# Patient Record
Sex: MALE | Race: BLACK OR AFRICAN AMERICAN | NOT HISPANIC OR LATINO | Employment: UNEMPLOYED | URBAN - METROPOLITAN AREA
[De-identification: names, ages, dates, MRNs, and addresses within clinical notes are randomized per-mention and may not be internally consistent; named-entity substitution may affect disease eponyms.]

---

## 2018-10-25 ENCOUNTER — HISTORICAL (OUTPATIENT)
Dept: LAB | Facility: HOSPITAL | Age: 58
End: 2018-10-25

## 2018-10-25 LAB
ABS NEUT (OLG): 2.9 X10(3)/MCL (ref 1.5–6.9)
ALBUMIN SERPL-MCNC: 3.8 GM/DL (ref 3.4–5)
ALBUMIN/GLOB SERPL: 0.8 RATIO
ALP SERPL-CCNC: 99 UNIT/L (ref 30–113)
ALT SERPL-CCNC: 44 UNIT/L (ref 10–45)
AST SERPL-CCNC: 71 UNIT/L (ref 15–37)
BASOPHILS # BLD AUTO: 0.1 X10(3)/MCL (ref 0–0.1)
BASOPHILS NFR BLD AUTO: 1 % (ref 0–1)
BILIRUB SERPL-MCNC: 0.6 MG/DL (ref 0.1–0.9)
BILIRUBIN DIRECT+TOT PNL SERPL-MCNC: 0.2 MG/DL (ref 0–0.3)
BILIRUBIN DIRECT+TOT PNL SERPL-MCNC: 0.4 MG/DL
BUN SERPL-MCNC: 10 MG/DL (ref 10–20)
CALCIUM SERPL-MCNC: 9.9 MG/DL (ref 8–10.5)
CHLORIDE SERPL-SCNC: 99 MMOL/L (ref 100–108)
CO2 SERPL-SCNC: 28 MMOL/L (ref 21–35)
CREAT SERPL-MCNC: 0.69 MG/DL (ref 0.7–1.3)
EOSINOPHIL # BLD AUTO: 0.3 X10(3)/MCL (ref 0–0.6)
EOSINOPHIL NFR BLD AUTO: 4 % (ref 0–5)
ERYTHROCYTE [DISTWIDTH] IN BLOOD BY AUTOMATED COUNT: 17.3 % (ref 11.5–17)
GLOBULIN SER-MCNC: 4.6 GM/DL
GLUCOSE SERPL-MCNC: 75 MG/DL (ref 75–116)
HCT VFR BLD AUTO: 39.4 % (ref 42–52)
HGB BLD-MCNC: 12.8 GM/DL (ref 14–18)
IMM GRANULOCYTES # BLD AUTO: 0.02 10*3/UL (ref 0–0.02)
IMM GRANULOCYTES NFR BLD AUTO: 0.3 % (ref 0–0.43)
LYMPHOCYTES # BLD AUTO: 2.4 X10(3)/MCL (ref 0.5–4.1)
LYMPHOCYTES NFR BLD AUTO: 37 % (ref 15–40)
MCH RBC QN AUTO: 27 PG (ref 27–34)
MCHC RBC AUTO-ENTMCNC: 32 GM/DL (ref 31–36)
MCV RBC AUTO: 84 FL (ref 80–99)
MONOCYTES # BLD AUTO: 0.9 X10(3)/MCL (ref 0–1.1)
MONOCYTES NFR BLD AUTO: 14 % (ref 4–12)
NEUTROPHILS # BLD AUTO: 2.9 X10(3)/MCL (ref 1.5–6.9)
NEUTROPHILS NFR BLD AUTO: 44 % (ref 43–75)
PLATELET # BLD AUTO: 234 X10(3)/MCL (ref 140–400)
PMV BLD AUTO: 10.2 FL (ref 6.8–10)
POTASSIUM SERPL-SCNC: 3.8 MMOL/L (ref 3.6–5.2)
PROT SERPL-MCNC: 8.4 GM/DL (ref 6.4–8.2)
RBC # BLD AUTO: 4.71 X10(6)/MCL (ref 4.7–6.1)
SODIUM SERPL-SCNC: 139 MMOL/L (ref 135–145)
WBC # SPEC AUTO: 6.5 X10(3)/MCL (ref 4.5–11.5)

## 2018-12-13 ENCOUNTER — HISTORICAL (OUTPATIENT)
Dept: LAB | Facility: HOSPITAL | Age: 58
End: 2018-12-13

## 2018-12-13 LAB
ABS NEUT (OLG): 2.5 X10(3)/MCL (ref 1.5–6.9)
ALBUMIN SERPL-MCNC: 3.6 GM/DL (ref 3.4–5)
ALBUMIN/GLOB SERPL: 0.8 RATIO
ALP SERPL-CCNC: 113 UNIT/L (ref 30–113)
ALT SERPL-CCNC: 24 UNIT/L (ref 10–45)
AST SERPL-CCNC: 73 UNIT/L (ref 15–37)
BASOPHILS # BLD AUTO: 0 X10(3)/MCL (ref 0–0.1)
BASOPHILS NFR BLD AUTO: 1 % (ref 0–1)
BILIRUB SERPL-MCNC: 0.2 MG/DL (ref 0.1–0.9)
BILIRUBIN DIRECT+TOT PNL SERPL-MCNC: 0.1 MG/DL
BILIRUBIN DIRECT+TOT PNL SERPL-MCNC: 0.1 MG/DL (ref 0–0.3)
BUN SERPL-MCNC: 11 MG/DL (ref 10–20)
CALCIUM SERPL-MCNC: 9.1 MG/DL (ref 8–10.5)
CHLORIDE SERPL-SCNC: 100 MMOL/L (ref 100–108)
CO2 SERPL-SCNC: 27 MMOL/L (ref 21–35)
CREAT SERPL-MCNC: 0.94 MG/DL (ref 0.7–1.3)
EOSINOPHIL # BLD AUTO: 0.2 X10(3)/MCL (ref 0–0.6)
EOSINOPHIL NFR BLD AUTO: 4 % (ref 0–5)
ERYTHROCYTE [DISTWIDTH] IN BLOOD BY AUTOMATED COUNT: 16.5 % (ref 11.5–17)
GLOBULIN SER-MCNC: 4.5 GM/DL
GLUCOSE SERPL-MCNC: 83 MG/DL (ref 75–116)
HCT VFR BLD AUTO: 31.3 % (ref 42–52)
HGB BLD-MCNC: 10.4 GM/DL (ref 14–18)
IMM GRANULOCYTES # BLD AUTO: 0.02 10*3/UL (ref 0–0.02)
IMM GRANULOCYTES NFR BLD AUTO: 0.4 % (ref 0–0.43)
LYMPHOCYTES # BLD AUTO: 2 X10(3)/MCL (ref 0.5–4.1)
LYMPHOCYTES NFR BLD AUTO: 38 % (ref 15–40)
MCH RBC QN AUTO: 28 PG (ref 27–34)
MCHC RBC AUTO-ENTMCNC: 33 GM/DL (ref 31–36)
MCV RBC AUTO: 85 FL (ref 80–99)
MONOCYTES # BLD AUTO: 0.6 X10(3)/MCL (ref 0–1.1)
MONOCYTES NFR BLD AUTO: 11 % (ref 4–12)
NEUTROPHILS # BLD AUTO: 2.5 X10(3)/MCL (ref 1.5–6.9)
NEUTROPHILS NFR BLD AUTO: 46 % (ref 43–75)
PLATELET # BLD AUTO: 181 X10(3)/MCL (ref 140–400)
PMV BLD AUTO: 8.9 FL (ref 6.8–10)
POTASSIUM SERPL-SCNC: 3.8 MMOL/L (ref 3.6–5.2)
PROT SERPL-MCNC: 8.1 GM/DL (ref 6.4–8.2)
RBC # BLD AUTO: 3.67 X10(6)/MCL (ref 4.7–6.1)
SODIUM SERPL-SCNC: 140 MMOL/L (ref 135–145)
WBC # SPEC AUTO: 5.4 X10(3)/MCL (ref 4.5–11.5)

## 2019-12-18 ENCOUNTER — HISTORICAL (OUTPATIENT)
Dept: ADMINISTRATIVE | Facility: HOSPITAL | Age: 59
End: 2019-12-18

## 2019-12-24 LAB
FINAL CULTURE: NORMAL
FINAL CULTURE: NORMAL

## 2020-08-20 ENCOUNTER — HISTORICAL (OUTPATIENT)
Dept: ADMINISTRATIVE | Facility: HOSPITAL | Age: 60
End: 2020-08-20

## 2020-08-26 LAB
FINAL CULTURE: NORMAL
FINAL CULTURE: NORMAL

## 2021-06-24 ENCOUNTER — HISTORICAL (OUTPATIENT)
Dept: ADMINISTRATIVE | Facility: HOSPITAL | Age: 61
End: 2021-06-24

## 2021-06-29 LAB — FINAL CULTURE: NORMAL

## 2021-07-05 ENCOUNTER — HISTORICAL (OUTPATIENT)
Dept: ADMINISTRATIVE | Facility: HOSPITAL | Age: 61
End: 2021-07-05

## 2021-07-11 LAB
FINAL CULTURE: NORMAL
FINAL CULTURE: NORMAL

## 2022-01-21 ENCOUNTER — HISTORICAL (OUTPATIENT)
Dept: RADIOLOGY | Facility: HOSPITAL | Age: 62
End: 2022-01-21

## 2022-01-24 ENCOUNTER — HISTORICAL (OUTPATIENT)
Dept: RADIOLOGY | Facility: HOSPITAL | Age: 62
End: 2022-01-24

## 2022-02-21 ENCOUNTER — HISTORICAL (OUTPATIENT)
Dept: LAB | Facility: HOSPITAL | Age: 62
End: 2022-02-21

## 2022-02-21 LAB
ABS NEUT (OLG): 2.7 (ref 1.5–6.9)
ALBUMIN SERPL-MCNC: 4.2 G/DL (ref 3.5–5)
ALBUMIN/GLOB SERPL: 1.1 {RATIO} (ref 1.1–2)
ALP SERPL-CCNC: 91 U/L (ref 40–150)
ALT SERPL-CCNC: 9 U/L (ref 0–55)
AST SERPL-CCNC: 17 U/L (ref 5–34)
BILIRUB SERPL-MCNC: 0.2 MG/DL
BILIRUBIN DIRECT+TOT PNL SERPL-MCNC: 0.1 (ref 0–0.5)
BILIRUBIN DIRECT+TOT PNL SERPL-MCNC: 0.1 (ref 0–0.8)
BUN SERPL-MCNC: 17 MG/DL (ref 8.4–25.7)
CALCIUM SERPL-MCNC: 9.7 MG/DL (ref 8.7–10.5)
CHLORIDE SERPL-SCNC: 104 MMOL/L (ref 98–107)
CO2 SERPL-SCNC: 24 MMOL/L (ref 22–29)
CREAT SERPL-MCNC: 0.99 MG/DL (ref 0.73–1.18)
ERYTHROCYTE [DISTWIDTH] IN BLOOD BY AUTOMATED COUNT: 17.6 % (ref 11.5–17)
GLOBULIN SER-MCNC: 3.7 G/DL (ref 2.4–3.5)
GLUCOSE SERPL-MCNC: 70 MG/DL (ref 74–100)
HCT VFR BLD AUTO: 39.6 % (ref 42–52)
HEMOLYSIS INTERF INDEX SERPL-ACNC: >10
HEMOLYSIS INTERF INDEX SERPL-ACNC: >10
HGB BLD-MCNC: 12.5 G/DL (ref 14–18)
ICTERIC INTERF INDEX SERPL-ACNC: 0
LIPEMIC INTERF INDEX SERPL-ACNC: 3
MAGNESIUM SERPL-MCNC: 1.9 MG/DL (ref 1.6–2.6)
MANUAL DIFF? (OHS): YES
MCH RBC QN AUTO: 25 PG (ref 27–34)
MCHC RBC AUTO-ENTMCNC: 32 G/DL (ref 31–36)
MCV RBC AUTO: 78 FL (ref 80–99)
PLATELET # BLD AUTO: 202 10*3/UL (ref 140–400)
PMV BLD AUTO: 10.5 FL (ref 6.8–10)
POTASSIUM SERPL-SCNC: 4.4 MMOL/L (ref 3.5–5.1)
PROT SERPL-MCNC: 7.9 G/DL (ref 6.4–8.3)
RBC # BLD AUTO: 5.06 10*6/UL (ref 4.7–6.1)
SODIUM SERPL-SCNC: 138 MMOL/L (ref 136–145)
WBC # SPEC AUTO: 7.3 10*3/UL (ref 4.5–11.5)

## 2022-05-19 DIAGNOSIS — C92.10 CML (CHRONIC MYELOCYTIC LEUKEMIA): Primary | ICD-10-CM

## 2022-05-19 RX ORDER — ONDANSETRON 4 MG/1
4 TABLET, FILM COATED ORAL EVERY 6 HOURS PRN
Qty: 90 TABLET | Refills: 2 | Status: SHIPPED | OUTPATIENT
Start: 2022-05-19

## 2022-05-19 RX ORDER — ONDANSETRON 4 MG/1
4 TABLET, FILM COATED ORAL EVERY 6 HOURS PRN
COMMUNITY
Start: 2022-02-04 | End: 2022-05-19 | Stop reason: SDUPTHER

## 2022-05-25 DIAGNOSIS — C92.10 CHRONIC MYELOID LEUKEMIA: Primary | ICD-10-CM

## 2022-05-26 ENCOUNTER — LAB VISIT (OUTPATIENT)
Dept: LAB | Facility: HOSPITAL | Age: 62
End: 2022-05-26
Payer: MEDICARE

## 2022-05-26 DIAGNOSIS — C92.10 CHRONIC MYELOID LEUKEMIA: ICD-10-CM

## 2022-05-26 DIAGNOSIS — C92.10 CHRONIC MYELOID LEUKEMIA: Primary | ICD-10-CM

## 2022-05-26 LAB
ABS NEUT CALC (OHS): 201.12 X10(3)/MCL (ref 2.1–9.2)
B-HCG SERPL QL: 17 %
EOSINOPHIL NFR BLD MANUAL: 1 % (ref 0–8)
EOSINOPHIL NFR BLD MANUAL: 2.12 X10(3)/MCL (ref 0–0.9)
ERYTHROCYTE [DISTWIDTH] IN BLOOD BY AUTOMATED COUNT: 19.9 % (ref 11.5–17)
HCT VFR BLD AUTO: 37.2 % (ref 42–52)
HGB BLD-MCNC: 12.2 GM/DL (ref 14–18)
IMM GRANULOCYTES # BLD AUTO: 70.67 X10(3)/MCL (ref 0–0.02)
IMM GRANULOCYTES NFR BLD AUTO: 33.4 % (ref 0–0.43)
LYMPHOCYTES NFR BLD MANUAL: 3 % (ref 13–40)
LYMPHOCYTES NFR BLD MANUAL: 6.35 X10(3)/MCL
MCH RBC QN AUTO: 25.2 PG (ref 27–31)
MCHC RBC AUTO-ENTMCNC: 32.8 MG/DL (ref 33–36)
MCV RBC AUTO: 76.9 FL (ref 80–94)
MONOCYTES NFR BLD MANUAL: 1 % (ref 2–11)
MONOCYTES NFR BLD MANUAL: 2.12 X10(3)/MCL (ref 0.1–1.3)
MYELOCYTES NFR BLD MANUAL: 15 %
NEUTROPHILS NFR BLD MANUAL: 47 % (ref 47–80)
NEUTS BAND NFR BLD MANUAL: 16 % (ref 0–11)
PLATELET # BLD AUTO: 202 X10(3)/MCL (ref 130–400)
PLATELET # BLD EST: ADEQUATE 10*3/UL
PMV BLD AUTO: 10.9 FL (ref 9.4–12.4)
RBC # BLD AUTO: 4.84 X10(6)/MCL (ref 4.7–6.1)
WBC # SPEC AUTO: 211.7 X10(3)/MCL (ref 4.5–11.5)

## 2022-05-26 PROCEDURE — 85060 BLOOD SMEAR INTERPRETATION: CPT

## 2022-05-26 PROCEDURE — 85025 COMPLETE CBC W/AUTO DIFF WBC: CPT

## 2022-05-26 PROCEDURE — 36415 COLL VENOUS BLD VENIPUNCTURE: CPT

## 2022-05-27 LAB — HEMATOLOGIST REVIEW: NORMAL

## 2022-05-31 DIAGNOSIS — C92.10 CHRONIC MYELOID LEUKEMIA: Primary | ICD-10-CM

## 2022-06-01 ENCOUNTER — OFFICE VISIT (OUTPATIENT)
Dept: HEMATOLOGY/ONCOLOGY | Facility: CLINIC | Age: 62
End: 2022-06-01
Payer: MEDICARE

## 2022-06-01 VITALS
WEIGHT: 144 LBS | SYSTOLIC BLOOD PRESSURE: 86 MMHG | RESPIRATION RATE: 16 BRPM | OXYGEN SATURATION: 96 % | DIASTOLIC BLOOD PRESSURE: 53 MMHG | TEMPERATURE: 97 F | HEART RATE: 65 BPM | HEIGHT: 72 IN | BODY MASS INDEX: 19.5 KG/M2

## 2022-06-01 DIAGNOSIS — D63.0 ANEMIA IN NEOPLASTIC DISEASE: ICD-10-CM

## 2022-06-01 DIAGNOSIS — C92.10 CML (CHRONIC MYELOCYTIC LEUKEMIA): Primary | ICD-10-CM

## 2022-06-01 DIAGNOSIS — C92.10 CHRONIC MYELOID LEUKEMIA: ICD-10-CM

## 2022-06-01 PROCEDURE — 99999 PR PBB SHADOW E&M-EST. PATIENT-LVL IV: CPT | Mod: PBBFAC,,, | Performed by: INTERNAL MEDICINE

## 2022-06-01 PROCEDURE — 99999 PR PBB SHADOW E&M-EST. PATIENT-LVL IV: ICD-10-PCS | Mod: PBBFAC,,, | Performed by: INTERNAL MEDICINE

## 2022-06-01 PROCEDURE — 99215 PR OFFICE/OUTPT VISIT, EST, LEVL V, 40-54 MIN: ICD-10-PCS | Mod: S$PBB,,, | Performed by: INTERNAL MEDICINE

## 2022-06-01 PROCEDURE — 99214 OFFICE O/P EST MOD 30 MIN: CPT | Mod: PBBFAC | Performed by: INTERNAL MEDICINE

## 2022-06-01 PROCEDURE — 99215 OFFICE O/P EST HI 40 MIN: CPT | Mod: S$PBB,,, | Performed by: INTERNAL MEDICINE

## 2022-06-01 RX ORDER — GABAPENTIN 100 MG/1
100 CAPSULE ORAL 3 TIMES DAILY
COMMUNITY
Start: 2021-07-10

## 2022-06-01 RX ORDER — ISOSORBIDE MONONITRATE 30 MG/1
15 TABLET, EXTENDED RELEASE ORAL DAILY
COMMUNITY
Start: 2021-07-10

## 2022-06-01 RX ORDER — SERTRALINE HYDROCHLORIDE 25 MG/1
25 TABLET, FILM COATED ORAL DAILY
COMMUNITY
Start: 2022-04-04

## 2022-06-01 RX ORDER — LEVOFLOXACIN 750 MG/1
500 TABLET ORAL DAILY
COMMUNITY
Start: 2021-07-10 | End: 2022-09-27

## 2022-06-01 RX ORDER — AZELASTINE 1 MG/ML
2 SPRAY, METERED NASAL 2 TIMES DAILY
COMMUNITY

## 2022-06-01 RX ORDER — NAPROXEN SODIUM 220 MG/1
81 TABLET, FILM COATED ORAL DAILY
COMMUNITY
Start: 2021-07-15

## 2022-06-01 RX ORDER — CLOPIDOGREL BISULFATE 75 MG/1
75 TABLET ORAL DAILY
COMMUNITY
Start: 2022-03-15

## 2022-06-01 RX ORDER — HYDROXYUREA 500 MG/1
500 CAPSULE ORAL 2 TIMES DAILY
Qty: 60 CAPSULE | Refills: 0 | Status: SHIPPED | OUTPATIENT
Start: 2022-06-01 | End: 2022-06-27 | Stop reason: SDUPTHER

## 2022-06-01 RX ORDER — PRAVASTATIN SODIUM 20 MG/1
20 TABLET ORAL NIGHTLY
COMMUNITY
Start: 2021-07-27

## 2022-06-01 NOTE — PROGRESS NOTES
PATIENT: Poli Armendariz .  MRN: [id number]  DATE: 6/1/2022    PCP: Sergey Andino MD  Cardiology: [facility]       Chief Complaint: CML (F/u with labs--- Pt reports no new concerns)      Oncologic History:      Oncologic History CML:  5/29/2009 BMB   Hypercellular myeloid predominant marrow with left shifted myeloid maturation, and mild eosinophilia and basophilia, consistent with chronic myelogenous leukemia.   Flow cytometry-findings suggestive of left shifted myeloid maturation no evidence of acute leukemia, non-Hodgkin's lymphoma or plasma cell dyscrasia.   FISH for CML: BCR-ABL: BCR-ABL translocation was observed and 84.5% of cells analyzed using the BCR-ABL dual fusion probes, consistent with involvement by CML. Translocation of the ABL gene on chromosome 9 with the see BCR gene on chromosome 22 is consistent with diagnosis of chronic myelogenous leukemia   PCR for Matt to: No JAK2 mutation was detected by PCR.      Oncologic Treatment Gleevec since initial Dx. BCR-ABL 5% May or June 2018, not able to tolerate it, and Changed to Sprycel  Sprycel June 2018 - unclear when stopped  2019 admitted for multiple neurological issues prolong ICU stay, subdural hematoma.  Sprycel restarted 4/23/2020  Sprycel dose reduced 8/2021 due to pleural effusion.   sprycel 12/26/2021 stopped due to pleural effusion  CXR 1/21/2022 pleural effusion resolved--> plans for different medication; bosulif. Script sent 1/27/22 for Bosulif - Stopped on 6/1/22 for increased WBC  ECHO with EF 60%      Pathology           Subjective:   Interval History: Mr. Armendariz is a 61 y.o. male who returns for scheduled follow up visit with his son Kulwinder for f/u of CML currently taking  Bosulif.   His son states he once again has a recurrent sinus infection and seen his PCP on 5/5/22. Labs were completed and elevated  .7 noted. Mr. Ashton does complain of right sides nasal congestion and pain below right eye. 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   He denies n/v/d/c, fever, abdominal pain, HA, abnormal bleeding issues, edema. rash, or fatigue. His appetite is good. Noted 10 pound weight gain.         Past Medical History:   Past Medical History:   Diagnosis Date    Amputation of toe     CML (chronic myelocytic leukemia)     History of craniotomy        Past Surgical HIstory: History reviewed. No pertinent surgical history.    Family History:   Family History   Problem Relation Age of Onset    Cancer Mother     Cancer Paternal Grandfather        Social History:  reports that he has never smoked. He has never used smokeless tobacco. He reports current alcohol use. He reports that he does not use drugs.    Allergies:  Review of patient's allergies indicates:   Allergen Reactions    Sprycel [dasatinib]        Medications:  Current Outpatient Medications   Medication Sig Dispense Refill    aspirin 81 MG Chew Take 81 mg by mouth once daily.      azelastine (ASTELIN) 137 mcg (0.1 %) nasal spray 2 sprays by Nasal route 2 (two) times a day.      bosutinib (BOSULIF) 500 mg Tab Take 500 mg by mouth once daily.      clopidogreL (PLAVIX) 75 mg tablet Take 75 mg by mouth once daily.      gabapentin (NEURONTIN) 100 MG capsule Take 100 mg by mouth 3 (three) times daily.      isosorbide mononitrate (IMDUR) 30 MG 24 hr tablet Take 15 mg by mouth once daily.      levoFLOXacin (LEVAQUIN) 750 MG tablet Take 500 mg by mouth once daily.      metoprolol succinate 25 mg CSpX Take 25 mg by mouth once daily.      ondansetron (ZOFRAN) 4 MG tablet Take 1 tablet (4 mg total) by mouth every 6 (six) hours as needed. 90 tablet 2    pravastatin (PRAVACHOL) 20 MG tablet Take 20 mg by mouth every evening.      sertraline (ZOLOFT) 25 MG tablet Take 25 mg by mouth once daily.       No current facility-administered medications for this visit.       Review of Systems   Constitutional: Negative.    HENT: Negative.    Eyes: Negative.   "  Respiratory: Negative.    Cardiovascular: Negative.    Gastrointestinal: Negative.    Endocrine: Negative.    Genitourinary: Negative.    Musculoskeletal: Negative.    Skin: Negative.    Allergic/Immunologic: Negative.    Neurological: Negative.    Hematological: Negative.    Psychiatric/Behavioral: Negative.    All other systems reviewed and are negative.      ECOG Performance Status:   ECOG SCORE           Objective:      Vitals:   Vitals:    06/01/22 1202   BP: (!) 86/53   BP Location: Left arm   Pulse: 65   Resp: 16   Temp: 97 °F (36.1 °C)   SpO2: 96%   Weight: 65.3 kg (144 lb)   Height: 5' 11.97" (1.828 m)     BMI: Body mass index is 19.55 kg/m².    Physical Exam  Vitals and nursing note reviewed.   Constitutional:       Appearance: Normal appearance.   HENT:      Head: Normocephalic and atraumatic.      Nose: Nose normal.      Mouth/Throat:      Mouth: Mucous membranes are moist.   Eyes:      Conjunctiva/sclera: Conjunctivae normal.   Cardiovascular:      Rate and Rhythm: Normal rate and regular rhythm.      Pulses: Normal pulses.      Heart sounds: Normal heart sounds.   Pulmonary:      Effort: Pulmonary effort is normal.      Breath sounds: Normal breath sounds.   Abdominal:      Palpations: Abdomen is soft.      Tenderness: There is no abdominal tenderness.   Musculoskeletal:         General: No tenderness.      Cervical back: Neck supple.      Comments: S/p right BKA   Skin:     Findings: No erythema or rash.   Neurological:      General: No focal deficit present.      Mental Status: He is alert and oriented to person, place, and time. Mental status is at baseline.         Laboratory Data:  Lab Visit on 05/26/2022   Component Date Value Ref Range Status    Peripheral Smear Evaluation 05/26/2022 Chronic Myelogenous Leukemia, chronic phase.    A manual differential cell count demonstrates 4% blasts.  This smear was reviewed in intradepartmental consultation by Dr. Lawton, Hematopathologist.    Kavin GARCIA" MD S[place]     Final    WBC 05/26/2022 211.7 (A) 4.5 - 11.5 x10(3)/mcL Final    RBC 05/26/2022 4.84  4.70 - 6.10 x10(6)/mcL Final    Hgb 05/26/2022 12.2 (A) 14.0 - 18.0 gm/dL Final    Hct 05/26/2022 37.2 (A) 42.0 - 52.0 % Final    MCV 05/26/2022 76.9 (A) 80.0 - 94.0 fL Final    MCH 05/26/2022 25.2 (A) 27.0 - 31.0 pg Final    MCHC 05/26/2022 32.8 (A) 33.0 - 36.0 mg/dL Final    RDW 05/26/2022 19.9 (A) 11.5 - 17.0 % Final    Platelet 05/26/2022 202  130 - 400 x10(3)/mcL Final    MPV 05/26/2022 10.9  9.4 - 12.4 fL Final    IG# 05/26/2022 70.67 (A) 0 - 0.0155 x10(3)/mcL Final    IG% 05/26/2022 33.4 (A) 0 - 0.43 % Final    Neut Man 05/26/2022 47  47 - 80 % Final    Band Neutrophil Man 05/26/2022 16 (A) 0 - 11 % Final    Lymph Man 05/26/2022 3 (A) 13 - 40 % Final    Monocyte Man 05/26/2022 1 (A) 2 - 11 % Final    Eos Man 05/26/2022 1  0 - 8 % Final    Myelo Man 05/26/2022 15  % Final    Blasts Man 05/26/2022 17  % Final    Abs Neut calc 05/26/2022 201.115 (A) 2.1 - 9.2 x10(3)/mcL Final    Abs Mono 05/26/2022 2.117 (A) 0.1 - 1.3 x10(3)/mcL Final    Abs Lymp 05/26/2022 6.351  0.6 - 4.6 x10(3)/mcL Final    Abs Eos  05/26/2022 2.117 (A) 0 - 0.9 x10(3)/mcL Final    Platelet Est 05/26/2022 Adequate  Adequate Final     Peripheral smear on 5/25/22: Borderline microcytic anemia, Adequate platelet population, Marked leukocytosis secondary to absolute neutrophilia, monocytosis, and basophilia with left-shifted granulocytic maturation. Rare (<1%) immature cells, consistent with blast identified.   Flow cytometry: There is phenotypic evidence of a granulocytic shift.   There peripheral smear and flow cytometry findings are worrisome for a myeloproliferative neoplasm,     Labs:   5/26/22: .7. Peripheral blood smear showed 4% blasts   4/26/22: WBC 87.06, Neutrophils 47.32, BCR- ABL positive  3/14/22: WBC 26.82, Neutrophils 11.3, Smear shows 2% blasts  1/14/22: WBC 5.26, Neutrophils 2.66, BCR-ABL  positive  12/20/21: WBC 5.62, Neutrophils 1.81  11/18/21: WBC 6.7, BCR-ABL positive       Imaging:   - Occult Stool positive on 9/1/20  Assessment:     1. CML (chronic myelocytic leukemia) C92.10   Gleevec was discontinued due to rash many years ago. Recurrent Pleural effusions on sprycel in 12/2021 and Sprycel discontinued, effusions resolved. EF on ECHO 60%   BOSULIF 500mg po daily was started in 01/2022. However, WBC had rapidly increased with increased blasts % during Bosulif treatment from January 2022 to May 2022.  Patient most likely has resistant to Bosulif. WBC is currently 211,000; therefore will stop Bosulif and start Hydrea 500 mg po BID to get WBC down to less than 1,000; then might consider to switch to different TKI that patient would be able to tolerate and likely to have disease response. Consider to do mutational analysis.     2. Anemia - stable H/H      Plan:  Follow up in 2 weeks with MD to go over echo, labs and to evaluate tolerance of Hydrea  CBC,CMP, BCR-ABL, peripheral smear, uric acid - 1 week prior to apt.   Flow cytometry pending  Will discontinue Bosulif due to persistent increased WBC   Will send Hydrea 500mg BID po  Echo complete 1/24/22- due now       Problem List Items Addressed This Visit    None

## 2022-06-02 ENCOUNTER — SPECIALTY PHARMACY (OUTPATIENT)
Dept: PHARMACY | Facility: CLINIC | Age: 62
End: 2022-06-02
Payer: MEDICARE

## 2022-06-02 NOTE — TELEPHONE ENCOUNTER
Incoming call from Terra at [facility]- wanted to confirm that [facility] received prescription for hydroxyurea. Confirmed that it was sent to [facility]. Will inform [facility] team to work up medication.   Test claim shoes that PA not required, copay $0.

## 2022-06-06 ENCOUNTER — SPECIALTY PHARMACY (OUTPATIENT)
Dept: PHARMACY | Facility: CLINIC | Age: 62
End: 2022-06-06
Payer: MEDICARE

## 2022-06-06 DIAGNOSIS — C92.10 CML (CHRONIC MYELOCYTIC LEUKEMIA): Primary | ICD-10-CM

## 2022-06-06 NOTE — TELEPHONE ENCOUNTER
Specialty Pharmacy - Initial Clinical Assessment    Specialty Medication Orders Linked to Encounter    Flowsheet Row Most Recent Value   Medication #1 hydroxyurea (HYDREA) 500 mg Cap (Order#[id number], Rx#[id number])          Refill Questions - Documented Responses    Flowsheet Row Most Recent Value   Patient Availability and HIPAA Verification    Does patient want to proceed with activity? Yes   HIPAA/medical authority confirmed? Yes   Relationship to patient of person spoken to? Child   Refill Screening Questions    When does the patient need to receive the medication? 06/08/22   Refill Delivery Questions    How will the patient receive the medication? Mail   When does the patient need to receive the medication? 06/08/22   Shipping Address Home   Address in [facility] confirmed and updated if neccessary? Yes   Expected Copay ($) 0   Is the patient able to afford the medication copay? Yes   Payment Method zero copay   Days supply of Refill 30   Supplies needed? No supplies needed   Refill activity completed? Yes   Refill activity plan Refill scheduled   Shipment/Pickup Date: 06/07/22          Current Outpatient Medications   Medication Sig    aspirin 81 MG Chew Take 81 mg by mouth once daily.    azelastine (ASTELIN) 137 mcg (0.1 %) nasal spray 2 sprays by Nasal route 2 (two) times a day.    bosutinib (BOSULIF) 500 mg Tab Take 500 mg by mouth once daily.    clopidogreL (PLAVIX) 75 mg tablet Take 75 mg by mouth once daily.    gabapentin (NEURONTIN) 100 MG capsule Take 100 mg by mouth 3 (three) times daily.    hydroxyurea (HYDREA) 500 mg Cap Take 1 capsule (500 mg total) by mouth 2 (two) times daily.    isosorbide mononitrate (IMDUR) 30 MG 24 hr tablet Take 15 mg by mouth once daily.    levoFLOXacin (LEVAQUIN) 750 MG tablet Take 500 mg by mouth once daily.    metoprolol succinate 25 mg CSpX Take 25 mg by mouth once daily.    ondansetron (ZOFRAN) 4 MG tablet Take 1 tablet (4 mg total) by mouth every 6  (six) hours as needed.    pravastatin (PRAVACHOL) 20 MG tablet Take 20 mg by mouth every evening.    sertraline (ZOLOFT) 25 MG tablet Take 25 mg by mouth once daily.   Last reviewed on 6/6/2022  3:31 PM by Nisreen Meza, PharmD    Review of patient's allergies indicates:   Allergen Reactions    Sprycel [dasatinib]     Last reviewed on  6/6/2022 3:31 PM by Nisreen Meza      Tasks added this encounter   No tasks added.   Tasks due within next 3 months   6/3/2022 - Clinical - Initial Assessment     Nisreen Meza, PharmD  [facility]  [address] [place] [id number]  Phone: [phone]  Fax: [phone]

## 2022-06-10 DIAGNOSIS — D63.0 ANEMIA IN NEOPLASTIC DISEASE: ICD-10-CM

## 2022-06-10 DIAGNOSIS — C92.10 CHRONIC MYELOID LEUKEMIA: ICD-10-CM

## 2022-06-10 DIAGNOSIS — C92.10 CML (CHRONIC MYELOCYTIC LEUKEMIA): Primary | ICD-10-CM

## 2022-06-10 LAB
% BLASTS: NORMAL
% NEUTROPHILS (OHS): NORMAL
LYMPHOCYTES NFR SPEC AUTO: NORMAL %
MONOCYTES %: NORMAL

## 2022-06-15 ENCOUNTER — LAB VISIT (OUTPATIENT)
Dept: LAB | Facility: HOSPITAL | Age: 62
End: 2022-06-15
Attending: INTERNAL MEDICINE
Payer: MEDICARE

## 2022-06-15 DIAGNOSIS — D63.0 ANEMIA IN NEOPLASTIC DISEASE: ICD-10-CM

## 2022-06-15 DIAGNOSIS — C92.10 CML (CHRONIC MYELOCYTIC LEUKEMIA): ICD-10-CM

## 2022-06-15 LAB
LDH SERPL-CCNC: 1054 U/L (ref 125–220)
URATE SERPL-MCNC: 12.6 MG/DL (ref 3.5–7.2)

## 2022-06-15 PROCEDURE — 83615 LACTATE (LD) (LDH) ENZYME: CPT

## 2022-06-15 PROCEDURE — 36415 COLL VENOUS BLD VENIPUNCTURE: CPT

## 2022-06-15 PROCEDURE — 84550 ASSAY OF BLOOD/URIC ACID: CPT

## 2022-06-17 DIAGNOSIS — C92.10 CML (CHRONIC MYELOCYTIC LEUKEMIA): Primary | ICD-10-CM

## 2022-06-17 DIAGNOSIS — D63.0 ANEMIA IN NEOPLASTIC DISEASE: ICD-10-CM

## 2022-06-22 ENCOUNTER — HOSPITAL ENCOUNTER (OUTPATIENT)
Dept: RADIOLOGY | Facility: HOSPITAL | Age: 62
Discharge: HOME | End: 2022-06-22
Attending: INTERNAL MEDICINE
Payer: MEDICARE

## 2022-06-22 DIAGNOSIS — C92.10 CML (CHRONIC MYELOCYTIC LEUKEMIA): ICD-10-CM

## 2022-06-22 DIAGNOSIS — C92.10 CHRONIC MYELOID LEUKEMIA: ICD-10-CM

## 2022-06-22 DIAGNOSIS — D63.0 ANEMIA IN NEOPLASTIC DISEASE: ICD-10-CM

## 2022-06-22 PROCEDURE — 93306 TTE W/DOPPLER COMPLETE: CPT

## 2022-06-23 LAB
AORTIC ROOT ANNULUS: 0 CM
AORTIC VALVE CUSP SEPERATION: 10.19 CM
AV INDEX (PROSTH): 1.13
AV MEAN GRADIENT: 1 MMHG
AV PEAK GRADIENT: 2 MMHG
AV VALVE AREA: 3.32 CM2
AV VELOCITY RATIO: 0.97
CV ECHO LV RWT: 0.77 CM
DOP CALC AO PEAK VEL: 0.66 M/S
DOP CALC AO VTI: 11.2 CM
DOP CALC LVOT AREA: 2.9 CM2
DOP CALC LVOT DIAMETER: 1.93 CM
DOP CALC LVOT PEAK VEL: 0.64 M/S
DOP CALC LVOT STROKE VOLUME: 37.14 CM3
DOP CALC MV VTI: 19 CM
DOP CALCLVOT PEAK VEL VTI: 12.7 CM
E WAVE DECELERATION TIME: 289.68 MSEC
E/A RATIO: 1.1
ECHO LV POSTERIOR WALL: 1.26 CM (ref 0.6–1.1)
EJECTION FRACTION: 50 %
FRACTIONAL SHORTENING: 24 % (ref 28–44)
INTERVENTRICULAR SEPTUM: 1.22 CM (ref 0.6–1.1)
LEFT ATRIUM SIZE: 3.01 CM
LEFT INTERNAL DIMENSION IN SYSTOLE: 2.47 CM (ref 2.1–4)
LEFT VENTRICLE DIASTOLIC VOLUME: 43.08 ML
LEFT VENTRICLE SYSTOLIC VOLUME: 21.55 ML
LEFT VENTRICULAR INTERNAL DIMENSION IN DIASTOLE: 3.27 CM (ref 3.5–6)
LEFT VENTRICULAR MASS: 129.68 G
LVOT MG: 0.8 MMHG
LVOT MV: 0.42 CM/S
MV MEAN GRADIENT: 1 MMHG
MV PEAK A VEL: 0.52 M/S
MV PEAK E VEL: 0.57 M/S
MV PEAK GRADIENT: 2 MMHG
MV STENOSIS PRESSURE HALF TIME: 84.01 MS
MV VALVE AREA BY CONTINUITY EQUATION: 1.95 CM2
MV VALVE AREA P 1/2 METHOD: 2.62 CM2
PISA TR MAX VEL: 1.22 M/S
PV PEAK VELOCITY: 0.58 CM/S
RIGHT VENTRICULAR END-DIASTOLIC DIMENSION: 2.09 CM
TR MAX PG: 6 MMHG

## 2022-06-23 NOTE — PROGRESS NOTES
PATIENT: Poli Armendariz .  MRN: [id number]  DATE: 6/24/2022    PCP: Sergey Andino MD  Cardiology: [facility]       Chief Complaint: CML (F/u with labs-- Pt reports whole body right sided pain starting last Thursday)      Oncologic History:      Oncologic History CML:  5/29/2009 BMB   Hypercellular myeloid predominant marrow with left shifted myeloid maturation, and mild eosinophilia and basophilia, consistent with chronic myelogenous leukemia.   Flow cytometry-findings suggestive of left shifted myeloid maturation no evidence of acute leukemia, non-Hodgkin's lymphoma or plasma cell dyscrasia.   FISH for CML: BCR-ABL: BCR-ABL translocation was observed and 84.5% of cells analyzed using the BCR-ABL dual fusion probes, consistent with involvement by CML. Translocation of the ABL gene on chromosome 9 with the see BCR gene on chromosome 22 is consistent with diagnosis of chronic myelogenous leukemia   PCR for Matt to: No JAK2 mutation was detected by PCR.      Oncologic Treatment Gleevec since initial Dx. BCR-ABL 5% May or June 2018, not able to tolerate it, and Changed to Sprycel  Sprycel June 2018 - unclear when stopped  2019 admitted for multiple neurological issues prolong ICU stay, subdural hematoma.  Sprycel restarted 4/23/2020  Sprycel dose reduced 8/2021 due to pleural effusion.   sprycel 12/26/2021 stopped due to pleural effusion  CXR 1/21/2022 pleural effusion resolved--> plans for different medication; bosulif. Script sent 1/27/22 for Bosulif - Stopped on 6/1/22 for increased WBC  ECHO with EF 60%      Pathology           Subjective:   Interval History: Mr. Armendariz is a 61 y.o. male who returns for scheduled follow up visit with his son Kulwinder for f/u of CML He was on Bosulif but stop it due to toxicities and side effects.  Last visit he was started on Hydrea twice a day for white blood cell counts over 200,000.  He was supposed to return in 2 weeks for toxicity check but he did not come.  Son is in charge of given  his the medication and he reports that he give the medications to his father as prescribed.  Patient is tolerating relatively fine.  He does report bone pain for which he takes ibuprofen but is not helping.  Patient has difficulty speaking and express himself with hand signals.   Labs were completed and elevated  .4 noted.  He had a right BKA.   He denies n/v/d/c, fever, abdominal pain, HA, abnormal bleeding issues, edema. rash, or fatigue.         Past Medical History:   Past Medical History:   Diagnosis Date    Amputation of toe     Aphasia     CML (chronic myelocytic leukemia)     CVA (cerebral vascular accident)     History of craniotomy     Hypothyroidism, unspecified     Myelocytic leukemia     PAD (peripheral artery disease)     Subdural hematoma     Tobacco use        Past Surgical HIstory:   Past Surgical History:   Procedure Laterality Date    B/L PA with Stent Placement  08/26/2020    BONE MARROW BIOPSY  05/29/2009    C-Spine Surgery      FOOT SURGERY      INSERTION OF PERCUTANEOUS ENDOSCOPIC GASTROSTOMY (PEG) FEEDING TUBE  08/20/2019    and 07/25/2019    Left Craniotomy Hematoma Evacuation   07/06/2019    Right Below the Knee Amputation  05/29/2009    Savory Dilation  08/20/2019    TOE AMPUTATION  12/14/2020       Family History:   Family History   Problem Relation Age of Onset    Colon cancer Mother     Leukemia Paternal Grandfather     Colon cancer Maternal Aunt        Social History:  reports that he has never smoked. He has never used smokeless tobacco. He reports current alcohol use of about 7.0 standard drinks of alcohol per week. He reports that he does not use drugs.    Allergies:  Review of patient's allergies indicates:   Allergen Reactions    Sprycel [dasatinib]        Medications:  Current Outpatient Medications   Medication Sig Dispense Refill    aspirin 81 MG Chew Take 81 mg by mouth once daily.      azelastine (ASTELIN) 137 mcg (0.1 %) nasal spray 2 sprays  by Nasal route 2 (two) times a day.      clopidogreL (PLAVIX) 75 mg tablet Take 75 mg by mouth once daily.      gabapentin (NEURONTIN) 100 MG capsule Take 100 mg by mouth 3 (three) times daily.      hydroxyurea (HYDREA) 500 mg Cap Take 1 capsule (500 mg total) by mouth 2 (two) times daily. 60 capsule 0    isosorbide mononitrate (IMDUR) 30 MG 24 hr tablet Take 15 mg by mouth once daily.      levoFLOXacin (LEVAQUIN) 750 MG tablet Take 500 mg by mouth once daily.      metoprolol succinate 25 mg CSpX Take 25 mg by mouth once daily.      naproxen sodium 220 mg Cap Take 1 tablet by mouth every 6 (six) hours as needed.      ondansetron (ZOFRAN) 4 MG tablet Take 1 tablet (4 mg total) by mouth every 6 (six) hours as needed. 90 tablet 2    pravastatin (PRAVACHOL) 20 MG tablet Take 20 mg by mouth every evening.      sertraline (ZOLOFT) 25 MG tablet Take 25 mg by mouth once daily.      bosutinib (BOSULIF) 500 mg Tab Take 500 mg by mouth once daily.      HYDROcodone-acetaminophen (NORCO)  mg per tablet Take 1 tablet by mouth every 4 (four) hours as needed for Pain. Quantity medically necessary for > 7 days for cancer pain 75 tablet 0     No current facility-administered medications for this visit.       Review of Systems   Constitutional: Positive for activity change, appetite change and fatigue.   HENT: Negative.    Eyes: Negative.    Respiratory: Negative.    Cardiovascular: Negative.    Gastrointestinal: Negative.    Endocrine: Negative.    Genitourinary: Negative.    Musculoskeletal: Positive for arthralgias, back pain and joint swelling.   Skin: Negative.    Allergic/Immunologic: Negative.    Neurological: Positive for facial asymmetry, speech difficulty and weakness.   Hematological: Negative.    Psychiatric/Behavioral: Negative.    All other systems reviewed and are negative.      ECOG Performance Status:   ECOG SCORE           Objective:      Vitals:   Vitals:    06/24/22 1047   BP: 97/67   BP Location:  "Right arm   Pulse: 66   Resp: 16   Temp: 97.5 °F (36.4 °C)   SpO2: 100%   Weight: 62.6 kg (138 lb)   Height: 5' 11.97" (1.828 m)     BMI: Body mass index is 18.73 kg/m².    Physical Exam  Vitals and nursing note reviewed.   Constitutional:       General: He is awake.      Appearance: He is ill-appearing.   HENT:      Head: Normocephalic and atraumatic.      Nose: Nose normal.      Mouth/Throat:      Mouth: Mucous membranes are moist.   Eyes:      Conjunctiva/sclera: Conjunctivae normal.   Cardiovascular:      Rate and Rhythm: Normal rate and regular rhythm.      Heart sounds: Normal heart sounds.   Pulmonary:      Effort: Pulmonary effort is normal.      Breath sounds: Normal breath sounds.   Abdominal:      Palpations: Abdomen is soft.      Tenderness: There is no abdominal tenderness.   Musculoskeletal:         General: No tenderness.      Cervical back: Neck supple.      Comments: S/p right BKA  Left 3rd toe amputataion      Right Lower Extremity: Right leg is amputated above knee.   Skin:     Findings: No erythema or rash.   Neurological:      Mental Status: He is alert and oriented to person, place, and time. Mental status is at baseline.      Cranial Nerves: Cranial nerve deficit, dysarthria and facial asymmetry present.      Sensory: Sensory deficit: Left side weakness.      Motor: Weakness present.         Laboratory Data:  Hospital Outpatient Visit on 06/22/2022   Component Date Value Ref Range Status    EF 06/22/2022 50  % Final    Left Ventricular Outflow Tract Yee* 06/22/2022 [id number]  cm/s Final    Left Ventricular Outflow Tract Yee* 06/22/2022 0.80  mmHg Final    AORTIC VALVE CUSP SEPERATION 06/22/2022 [id number]  cm Final    PV PEAK VELOCITY 06/22/2022 0.58  cm/s Final    LVIDd 06/22/2022 3.27 (A) 3.5 - 6.0 cm Final    IVS 06/22/2022 1.22 (A) 0.6 - 1.1 cm Final    Posterior Wall 06/22/2022 1.26 (A) 0.6 - 1.1 cm Final    Ao root annulus 06/22/2022 0.00  cm Final    LVIDs 06/22/2022 " 2.47  2.1 - 4.0 cm Final    FS 06/22/2022 24  28 - 44 % Final    LV mass 06/22/2022 129.68  g Final    LA size 06/22/2022 3.01  cm Final    RVDD 06/22/2022 2.09  cm Final    Left Ventricle Relative Wall Thick* 06/22/2022 0.77  cm Final    AV mean gradient 06/22/2022 1  mmHg Final    AV valve area 06/22/2022 3.32  cm2 Final    AV Velocity Ratio 06/22/2022 0.97   Final    AV index (prosthetic) 06/22/2022 1.13   Final    MV mean gradient 06/22/2022 1  mmHg Final    MV valve area p 1/2 method 06/22/2022 2.62  cm2 Final    MV valve area by continuity eq 06/22/2022 1.95  cm2 Final    E/A ratio 06/22/2022 1.10   Final    E wave deceleration time 06/22/2022 [id number]  msec Final    LVOT diameter 06/22/2022 1.93  cm Final    LVOT area 06/22/2022 2.9  cm2 Final    LVOT peak aly 06/22/2022 0.64  m/s Final    LVOT peak VTI 06/22/2022 12.70  cm Final    Ao peak aly 06/22/2022 0.66  m/s Final    Ao VTI 06/22/2022 11.2  cm Final    LVOT stroke volume 06/22/2022 37.14  cm3 Final    AV peak gradient 06/22/2022 2  mmHg Final    MV peak gradient 06/22/2022 2  mmHg Final    MV Peak E Aly 06/22/2022 0.57  m/s Final    TR Max Aly 06/22/2022 1.22  m/s Final    MV VTI 06/22/2022 19.0  cm Final    MV stenosis pressure 1/2 time 06/22/2022 84.359360958485635  ms Final    MV Peak A Aly 06/22/2022 0.52  m/s Final    LV Systolic Volume 06/22/2022 21.55  mL Final    LV Diastolic Volume 06/22/2022 43.08  mL Final    Triscuspid Valve Regurgitation Pea* 06/22/2022 6  mmHg Final   Lab Visit on 06/22/2022   Component Date Value Ref Range Status    Peripheral Smear Evaluation 06/22/2022 There is severe leukocytosis with immature granulocytic precursors characteristic of the patient's known history of chronic myelogenous leukemia.  There are less than 3% blasts.  Kavin Scott MD     Final    WBC 06/22/2022 119.4 (A) 4.5 - 11.5 x10(3)/mcL Final    RBC 06/22/2022 5.10  4.70 - 6.10 x10(6)/mcL Final    Hgb  06/22/2022 12.1 (A) 14.0 - 18.0 gm/dL Final    Hct 06/22/2022 38.7 (A) 42.0 - 52.0 % Final    MCV 06/22/2022 75.9 (A) 80.0 - 94.0 fL Final    MCH 06/22/2022 23.7 (A) 27.0 - 31.0 pg Final    MCHC 06/22/2022 31.3 (A) 33.0 - 36.0 mg/dL Final    RDW 06/22/2022 20.7 (A) 11.5 - 17.0 % Final    Platelet 06/22/2022 206  130 - 400 x10(3)/mcL Final    MPV 06/22/2022    Final    Unable to obtain    IG# 06/22/2022 32.45 (A) 0 - 0.0155 x10(3)/mcL Final    IG% 06/22/2022 27.2 (A) 0 - 0.43 % Final    Sodium Level 06/22/2022 135 (A) 136 - 145 mmol/L Final    Potassium Level 06/22/2022 4.2  3.5 - 5.1 mmol/L Final    Chloride 06/22/2022 104  98 - 107 mmol/L Final    Carbon Dioxide 06/22/2022 23  22 - 29 mmol/L Final    Glucose Level 06/22/2022 62 (A) 74 - 100 mg/dL Final    Blood Urea Nitrogen 06/22/2022 17.0  8.4 - 25.7 mg/dL Final    Creatinine 06/22/2022 1.06  0.73 - 1.18 mg/dL Final    Calcium Level Total 06/22/2022 9.7  8.4 - 10.2 mg/dL Final    Protein Total 06/22/2022 7.8  6.4 - 8.3 gm/dL Final    Albumin Level 06/22/2022 4.0  3.5 - 5.0 gm/dL Final    Globulin 06/22/2022 3.8 (A) 2.4 - 3.5 gm/dL Final    Albumin/Globulin Ratio 06/22/2022 1.1  1.1 - 2.0 ratio Final    Bilirubin Total 06/22/2022 0.3  <=1.5 mg/dL Final    Alkaline Phosphatase 06/22/2022 101  40 - 150 unit/L Final    Alanine Aminotransferase 06/22/2022 12  0 - 55 unit/L Final    Aspartate Aminotransferase 06/22/2022 30  5 - 34 unit/L Final    Estimated GFR- 06/22/2022 >60  mls/min/1.73/m2 Final    Neut Man 06/22/2022 53  47 - 80 % Final    Band Neutrophil Man 06/22/2022 21 (A) 0 - 11 % Final    Lymph Man 06/22/2022 7 (A) 13 - 40 % Final    Monocyte Man 06/22/2022 3  2 - 11 % Final    Eos Man 06/22/2022 2  0 - 8 % Final    [place] Man 06/22/2022 7  % Final    Myelo Man 06/22/2022 2  % Final    Blasts Man 06/22/2022 5  % Final    Abs Neut calc 06/22/2022 105.072 (A) 2.1 - 9.2 x10(3)/mcL Final    Abs Mono 06/22/2022  3.582 (A) 0.1 - 1.3 x10(3)/mcL Final    Abs Lymp 06/22/2022 8.358  0.6 - 4.6 x10(3)/mcL Final    Abs Eos  06/22/2022 2.388 (A) 0 - 0.9 x10(3)/mcL Final    Platelet Est 06/22/2022 Adequate  Normal, Adequate Final     Peripheral smear on 5/25/22: Borderline microcytic anemia, Adequate platelet population, Marked leukocytosis secondary to absolute neutrophilia, monocytosis, and basophilia with left-shifted granulocytic maturation. Rare (<1%) immature cells, consistent with blast identified.   Flow cytometry: There is phenotypic evidence of a granulocytic shift.   There peripheral smear and flow cytometry findings are worrisome for a myeloproliferative neoplasm,     Labs:   5/26/22: .7. Peripheral blood smear showed 4% blasts   4/26/22: WBC 87.06, Neutrophils 47.32, BCR- ABL positive  3/14/22: WBC 26.82, Neutrophils 11.3, Smear shows 2% blasts  1/14/22: WBC 5.26, Neutrophils 2.66, BCR-ABL positive  12/20/21: WBC 5.62, Neutrophils 1.81  11/18/21: WBC 6.7, BCR-ABL positive       Imaging:   - Occult Stool positive on 9/1/20  Assessment:     1. CML (chronic myelocytic leukemia) C92.10   Gleevec was discontinued due to rash many years ago. Recurrent Pleural effusions on sprycel in 12/2021 and Sprycel discontinued, effusions resolved. EF on ECHO 60%   BOSULIF 500mg po daily was started in 01/2022. However, WBC had rapidly increased with increased blasts % during Bosulif treatment from January 2022 to May 2022.  Patient most likely has resistant to Bosulif. WBC is currently 211,000; therefore will stop Bosulif and start Hydrea 500 mg po BID to get WBC down to less than 1,000; then might consider to switch to different TKI that patient would be able to tolerate and likely to have disease response. Consider to do mutational analysis.   WBC's elevated. Will increase Hydrea to TID    2. Anemia - stable H/H      Plan:  Follow up in 4 weeks with MD to go over labs and to evaluate tolerance of Hydrea  Labs in 2 weeks and  upon return  CBC,CMP, LDH 1 week prior to apt.   Flow cytometry pending  Increase Hydrea 500mg TID po       Problem List Items Addressed This Visit    None     Visit Diagnoses     CML (chronic myelocytic leukemia)    -  Primary    Relevant Medications    HYDROcodone-acetaminophen (NORCO)  mg per tablet    Other Relevant Orders    CBC Auto Differential    Comprehensive Metabolic Panel    Lactate Dehydrogenase    Pain        Relevant Medications    HYDROcodone-acetaminophen (NORCO)  mg per tablet        ROBERT THOMAS MD

## 2022-06-24 ENCOUNTER — OFFICE VISIT (OUTPATIENT)
Dept: HEMATOLOGY/ONCOLOGY | Facility: CLINIC | Age: 62
End: 2022-06-24
Payer: MEDICARE

## 2022-06-24 VITALS
DIASTOLIC BLOOD PRESSURE: 67 MMHG | HEIGHT: 72 IN | RESPIRATION RATE: 16 BRPM | WEIGHT: 138 LBS | TEMPERATURE: 98 F | OXYGEN SATURATION: 100 % | HEART RATE: 66 BPM | SYSTOLIC BLOOD PRESSURE: 97 MMHG | BODY MASS INDEX: 18.69 KG/M2

## 2022-06-24 DIAGNOSIS — C92.10 CML (CHRONIC MYELOCYTIC LEUKEMIA): Primary | ICD-10-CM

## 2022-06-24 DIAGNOSIS — R52 PAIN: ICD-10-CM

## 2022-06-24 PROCEDURE — 99215 OFFICE O/P EST HI 40 MIN: CPT | Mod: PBBFAC | Performed by: INTERNAL MEDICINE

## 2022-06-24 PROCEDURE — 99215 PR OFFICE/OUTPT VISIT, EST, LEVL V, 40-54 MIN: ICD-10-PCS | Mod: S$PBB,,, | Performed by: INTERNAL MEDICINE

## 2022-06-24 PROCEDURE — 99215 OFFICE O/P EST HI 40 MIN: CPT | Mod: S$PBB,,, | Performed by: INTERNAL MEDICINE

## 2022-06-24 PROCEDURE — 99999 PR PBB SHADOW E&M-EST. PATIENT-LVL V: ICD-10-PCS | Mod: PBBFAC,,, | Performed by: INTERNAL MEDICINE

## 2022-06-24 PROCEDURE — 99999 PR PBB SHADOW E&M-EST. PATIENT-LVL V: CPT | Mod: PBBFAC,,, | Performed by: INTERNAL MEDICINE

## 2022-06-24 RX ORDER — HYDROCODONE BITARTRATE AND ACETAMINOPHEN 10; 325 MG/1; MG/1
1 TABLET ORAL EVERY 4 HOURS PRN
Qty: 75 TABLET | Refills: 0 | Status: SHIPPED | OUTPATIENT
Start: 2022-06-24 | End: 2022-08-22

## 2022-06-24 RX ORDER — CHOLECALCIFEROL (VITAMIN D3) 125 MCG
1 CAPSULE ORAL EVERY 6 HOURS PRN
COMMUNITY

## 2022-06-27 DIAGNOSIS — D63.0 ANEMIA IN NEOPLASTIC DISEASE: ICD-10-CM

## 2022-06-27 DIAGNOSIS — C92.10 CML (CHRONIC MYELOCYTIC LEUKEMIA): ICD-10-CM

## 2022-06-27 RX ORDER — HYDROXYUREA 500 MG/1
500 CAPSULE ORAL 2 TIMES DAILY
Qty: 60 CAPSULE | Refills: 0 | Status: SHIPPED | OUTPATIENT
Start: 2022-06-27 | End: 2022-06-28 | Stop reason: SDUPTHER

## 2022-06-28 RX ORDER — HYDROXYUREA 500 MG/1
500 CAPSULE ORAL 2 TIMES DAILY
Qty: 60 CAPSULE | Refills: 2 | Status: SHIPPED | OUTPATIENT
Start: 2022-06-28 | End: 2022-07-01 | Stop reason: SDUPTHER

## 2022-07-01 ENCOUNTER — SPECIALTY PHARMACY (OUTPATIENT)
Dept: PHARMACY | Facility: CLINIC | Age: 62
End: 2022-07-01
Payer: MEDICARE

## 2022-07-01 DIAGNOSIS — D63.0 ANEMIA IN NEOPLASTIC DISEASE: ICD-10-CM

## 2022-07-01 DIAGNOSIS — C92.10 CML (CHRONIC MYELOCYTIC LEUKEMIA): ICD-10-CM

## 2022-07-01 RX ORDER — HYDROXYUREA 500 MG/1
500 CAPSULE ORAL 2 TIMES DAILY
Qty: 60 CAPSULE | Refills: 0 | Status: SHIPPED | OUTPATIENT
Start: 2022-07-01 | End: 2022-08-24

## 2022-07-01 RX ORDER — HYDROXYUREA 500 MG/1
500 CAPSULE ORAL 2 TIMES DAILY
Qty: 60 CAPSULE | Refills: 2 | Status: SHIPPED | OUTPATIENT
Start: 2022-07-01 | End: 2022-07-31

## 2022-07-01 NOTE — TELEPHONE ENCOUNTER
Outgoing call regarding Hydrea, spoke with son who states pt has 7 days on hand, informed son refill request has been sent to [facility] for approval. Will follow up 7/5.

## 2022-07-05 NOTE — TELEPHONE ENCOUNTER
Outgoing call regarding Hydrea refill. Pts son Kulwinder stated his dad received his medication the other day from [facility]. Pts son stated its okay to follow up in 3 weeks for next refill because he is not sure if the [facility] will keep him on the medication or change it.Pending out refill till 7/20/22.

## 2022-07-06 NOTE — TELEPHONE ENCOUNTER
[facility] checking on the status of refill for Hydrea. Advised that refill authorization was received yesterday 7/5, however patient's son reported that patient has received medication from another pharmacy and does not need refill at this time. [facility] will follow up with patient in 3 weeks for next refill. Caller verbalized understanding and had no further questions.

## 2022-07-22 ENCOUNTER — SPECIALTY PHARMACY (OUTPATIENT)
Dept: PHARMACY | Facility: CLINIC | Age: 62
End: 2022-07-22
Payer: MEDICARE

## 2022-07-22 NOTE — TELEPHONE ENCOUNTER
Outgoing call regarding Hydrea refill. Pts son Kulwinder stated he is going to ask his dad to see if he wanted to continue getting his medication from [facility] or did he want [facility] and pts son stated he will follow up with [facility] or we can reach out on 7/25/22.

## 2022-07-26 NOTE — TELEPHONE ENCOUNTER
Patients son estefania confirmed that he will no longer be using [facility] and will continue to fill his hydroxyurea with [facility]. Routing to assigned pharmacist.

## 2022-08-03 ENCOUNTER — DOCUMENTATION ONLY (OUTPATIENT)
Dept: HEMATOLOGY/ONCOLOGY | Facility: CLINIC | Age: 62
End: 2022-08-03
Payer: MEDICARE

## 2022-08-03 NOTE — PROGRESS NOTES
Pt is planning on having teeth extraction done. Spoke with Hannah @ [facility], she stated the doctor would not be back in office until 8/5/22, so we could have a better idea of when pt's teeth extraction would happen. Ms. Hannah did state that the dentist would be out of office from September to October so I will f/u with office on 8/5/22 to see if pt can have procedure done prior to dentist's out of office

## 2022-08-19 ENCOUNTER — APPOINTMENT (OUTPATIENT)
Dept: LAB | Facility: HOSPITAL | Age: 62
End: 2022-08-19
Attending: INTERNAL MEDICINE
Payer: MEDICARE

## 2022-08-19 PROCEDURE — 36415 COLL VENOUS BLD VENIPUNCTURE: CPT | Performed by: INTERNAL MEDICINE

## 2022-08-22 ENCOUNTER — LAB VISIT (OUTPATIENT)
Dept: LAB | Facility: HOSPITAL | Age: 62
End: 2022-08-22
Attending: INTERNAL MEDICINE
Payer: MEDICARE

## 2022-08-22 DIAGNOSIS — C92.10 CML (CHRONIC MYELOCYTIC LEUKEMIA): Primary | ICD-10-CM

## 2022-08-22 DIAGNOSIS — C92.10 CML (CHRONIC MYELOCYTIC LEUKEMIA): ICD-10-CM

## 2022-08-22 LAB
ABS NEUT CALC (OHS): 262.35 X10(3)/MCL (ref 2.1–9.2)
ALBUMIN SERPL-MCNC: 4.2 GM/DL (ref 3.5–5)
ALBUMIN/GLOB SERPL: 1.2 RATIO (ref 1.1–2)
ALP SERPL-CCNC: 139 UNIT/L (ref 40–150)
ALT SERPL-CCNC: 16 UNIT/L (ref 0–55)
AST SERPL-CCNC: 39 UNIT/L (ref 5–34)
B-HCG SERPL QL: 9 %
BILIRUBIN DIRECT+TOT PNL SERPL-MCNC: 0.4 MG/DL
BUN SERPL-MCNC: 18 MG/DL (ref 8.4–25.7)
CALCIUM SERPL-MCNC: 9.7 MG/DL (ref 8.4–10.2)
CHLORIDE SERPL-SCNC: 98 MMOL/L (ref 98–107)
CO2 SERPL-SCNC: 20 MMOL/L (ref 22–29)
CREAT SERPL-MCNC: 1.21 MG/DL (ref 0.73–1.18)
ERYTHROCYTE [DISTWIDTH] IN BLOOD BY AUTOMATED COUNT: 19.9 % (ref 11.5–17)
GFR SERPLBLD CREATININE-BSD FMLA CKD-EPI: >60 MLS/MIN/1.73/M2
GLOBULIN SER-MCNC: 3.6 GM/DL (ref 2.4–3.5)
GLUCOSE SERPL-MCNC: 61 MG/DL (ref 74–100)
HCT VFR BLD AUTO: 34.1 % (ref 42–52)
HGB BLD-MCNC: 11.5 GM/DL (ref 14–18)
HYPOCHROMIA BLD QL SMEAR: ABNORMAL
IMM GRANULOCYTES # BLD AUTO: 88.74 X10(3)/MCL (ref 0–0.04)
IMM GRANULOCYTES NFR BLD AUTO: 31.5 %
LDH SERPL-CCNC: 1286 U/L (ref 125–220)
LYMPHOCYTES NFR BLD MANUAL: 3 % (ref 13–40)
LYMPHOCYTES NFR BLD MANUAL: 8.46 X10(3)/MCL
MCH RBC QN AUTO: 25.2 PG (ref 27–31)
MCHC RBC AUTO-ENTMCNC: 33.7 MG/DL (ref 33–36)
MCV RBC AUTO: 74.8 FL (ref 80–94)
METAMYELOCYTES NFR BLD MANUAL: 7 %
MICROCYTES BLD QL SMEAR: ABNORMAL
MONOCYTES NFR BLD MANUAL: 11.28 X10(3)/MCL (ref 0.1–1.3)
MONOCYTES NFR BLD MANUAL: 4 % (ref 2–11)
MYELOCYTES NFR BLD MANUAL: 2 %
NEUTROPHILS NFR BLD MANUAL: 61 % (ref 47–80)
NEUTS BAND NFR BLD MANUAL: 12 % (ref 0–11)
OVALOCYTES (OLG): SLIGHT
PLATELET # BLD AUTO: 196 X10(3)/MCL (ref 130–400)
PLATELET # BLD EST: ADEQUATE 10*3/UL
PMV BLD AUTO: 10 FL (ref 7.4–10.4)
POIKILOCYTOSIS BLD QL SMEAR: SLIGHT
POTASSIUM SERPL-SCNC: 4.9 MMOL/L (ref 3.5–5.1)
PROMYELOCYTES # BLD MANUAL: 2 %
PROT SERPL-MCNC: 7.8 GM/DL (ref 6.4–8.3)
RBC # BLD AUTO: 4.56 X10(6)/MCL (ref 4.7–6.1)
RBC MORPH BLD: ABNORMAL
SODIUM SERPL-SCNC: 132 MMOL/L (ref 136–145)
TARGETS BLD QL SMEAR: SLIGHT
WBC # SPEC AUTO: 282.1 X10(3)/MCL (ref 4.5–11.5)

## 2022-08-22 PROCEDURE — 85060 BLOOD SMEAR INTERPRETATION: CPT

## 2022-08-22 PROCEDURE — 83615 LACTATE (LD) (LDH) ENZYME: CPT

## 2022-08-22 PROCEDURE — 80053 COMPREHEN METABOLIC PANEL: CPT

## 2022-08-22 PROCEDURE — 85025 COMPLETE CBC W/AUTO DIFF WBC: CPT

## 2022-08-22 PROCEDURE — 36415 COLL VENOUS BLD VENIPUNCTURE: CPT

## 2022-08-22 PROCEDURE — 84075 ASSAY ALKALINE PHOSPHATASE: CPT

## 2022-08-23 ENCOUNTER — TELEPHONE (OUTPATIENT)
Dept: HEMATOLOGY/ONCOLOGY | Facility: HOSPITAL | Age: 62
End: 2022-08-23
Payer: MEDICARE

## 2022-08-23 LAB — HEMATOLOGIST REVIEW: NORMAL

## 2022-08-23 NOTE — TELEPHONE ENCOUNTER
On 8/19/2022: I called the patient to discussed critical WBC's. Spoke with son. He reports that the patient is taking Hydrea 1 tid. Instructed to go to the ER. Patient was feeling good and stated that he has high WBC's for long time now and declined go to the Hospital. Instructed to increased Hydrea to 2 tabs tid and recheck CBC Monday.     PERIPHERAL BLOOD:  Consistent with the patient's known history of chronic myelogenous leukemia, chronic phase.  No evidence of accelerated phase or blast transformation.  Red blood cells are normochromic and normocytic.  There are occasional nucleated red cells. Platelets are normal in number and morphology.   Dimitri Webb MD    Today WBC's 282.1.  Will call the patient to see if he increased the Hydrea and how is he doing.

## 2022-08-23 NOTE — TELEPHONE ENCOUNTER
SPOKE WITH PT SON, STEPHEN, HE STATED PT IS TAKING HYDREA AS INSTRUCTED BY MD, 2 TABS TID. PT IS DOING WELL, EXCEPT FOR C/O PAIN TO RIGHT SIDE, THINKS IT MAY BE RELATED TO PT WHICH HE IS GETTING TWICE A WEEK.

## 2022-08-23 NOTE — TELEPHONE ENCOUNTER
Patient has an appt with [facility] in [place] tomorrow. I will defer further management to them    Elizabeth Castro MD

## 2022-08-24 ENCOUNTER — LAB VISIT (OUTPATIENT)
Dept: LAB | Facility: HOSPITAL | Age: 62
End: 2022-08-24
Attending: INTERNAL MEDICINE
Payer: MEDICARE

## 2022-08-24 ENCOUNTER — OFFICE VISIT (OUTPATIENT)
Dept: HEMATOLOGY/ONCOLOGY | Facility: CLINIC | Age: 62
End: 2022-08-24
Payer: MEDICARE

## 2022-08-24 VITALS
WEIGHT: 140.44 LBS | SYSTOLIC BLOOD PRESSURE: 115 MMHG | BODY MASS INDEX: 19.66 KG/M2 | DIASTOLIC BLOOD PRESSURE: 58 MMHG | RESPIRATION RATE: 18 BRPM | HEART RATE: 61 BPM | TEMPERATURE: 97 F | HEIGHT: 71 IN | OXYGEN SATURATION: 98 %

## 2022-08-24 DIAGNOSIS — C92.10 CML (CHRONIC MYELOCYTIC LEUKEMIA): Primary | ICD-10-CM

## 2022-08-24 DIAGNOSIS — C92.10 CML (CHRONIC MYELOCYTIC LEUKEMIA): ICD-10-CM

## 2022-08-24 DIAGNOSIS — D63.0 ANEMIA IN NEOPLASTIC DISEASE: ICD-10-CM

## 2022-08-24 PROCEDURE — 99214 OFFICE O/P EST MOD 30 MIN: CPT | Mod: PBBFAC | Performed by: INTERNAL MEDICINE

## 2022-08-24 PROCEDURE — 99215 OFFICE O/P EST HI 40 MIN: CPT | Mod: S$PBB,,, | Performed by: INTERNAL MEDICINE

## 2022-08-24 PROCEDURE — 99999 PR PBB SHADOW E&M-EST. PATIENT-LVL IV: ICD-10-PCS | Mod: PBBFAC,,, | Performed by: INTERNAL MEDICINE

## 2022-08-24 PROCEDURE — 99215 PR OFFICE/OUTPT VISIT, EST, LEVL V, 40-54 MIN: ICD-10-PCS | Mod: S$PBB,,, | Performed by: INTERNAL MEDICINE

## 2022-08-24 PROCEDURE — 99999 PR PBB SHADOW E&M-EST. PATIENT-LVL IV: CPT | Mod: PBBFAC,,, | Performed by: INTERNAL MEDICINE

## 2022-08-24 RX ORDER — HYDROXYUREA 500 MG/1
1000 CAPSULE ORAL 3 TIMES DAILY
Qty: 180 CAPSULE | Refills: 0 | Status: SHIPPED | OUTPATIENT
Start: 2022-08-24 | End: 2022-09-08

## 2022-08-24 NOTE — PROGRESS NOTES
PATIENT: Poli Armendariz .  MRN: [id number]  DATE: 8/24/2022    PCP: Sergey Andino MD  Cardiology: [facility]       Chief Complaint: CML (Pt reports no new issues or concerns.)      Oncologic History:      Oncologic History CML:  5/29/2009 BMB   Hypercellular myeloid predominant marrow with left shifted myeloid maturation, and mild eosinophilia and basophilia, consistent with chronic myelogenous leukemia.   Flow cytometry-findings suggestive of left shifted myeloid maturation no evidence of acute leukemia, non-Hodgkin's lymphoma or plasma cell dyscrasia.   FISH for CML: BCR-ABL: BCR-ABL translocation was observed and 84.5% of cells analyzed using the BCR-ABL dual fusion probes, consistent with involvement by CML. Translocation of the ABL gene on chromosome 9 with the see BCR gene on chromosome 22 is consistent with diagnosis of chronic myelogenous leukemia   PCR for Matt to: No JAK2 mutation was detected by PCR.      Oncologic Treatment Gleevec since initial Dx. BCR-ABL 5% May or June 2018, not able to tolerate it, and Changed to Sprycel  Sprycel June 2018 - unclear when stopped  2019 admitted for multiple neurological issues prolong ICU stay, subdural hematoma.  Sprycel restarted 4/23/2020  Sprycel dose reduced 8/2021 due to pleural effusion.   sprycel 12/26/2021 stopped due to pleural effusion  CXR 1/21/2022 pleural effusion resolved--> plans for different medication; bosulif. Script sent 1/27/22 for Bosulif - Stopped on 6/1/22 for increased WBC  ECHO with EF 60%  Increased Hydrea (500 mg tab) taking 2 tabs TID (total 3 gm daily) started 8/19/22--->  ECHO with EF 50%      Pathology           Subjective:   Interval History: Mr. Armendariz is a 61 y.o. male who returns for scheduled follow up visit with his son Kulwinder for f/u of CML He was on Bosulif but stop it due to toxicities and side effects.  Last visit he was started on Hydrea twice times a day for white blood cell counts over 200,000.  He was supposed to return in 2  weeks for toxicity check but he did not come. Son Kulwinder received call from Dr. Castro's nurse on 8/19/22 that instructed patient to increase Hydrea to 2 tabs TID related to increase in .9. Son is in charge of given his the medication and he reports that he give the medications to his father as prescribed.  Patient is tolerating relatively fine.  He does report bone pain for which he takes ibuprofen and norco, reports it does help with pain. Patient has difficulty speaking and express himself with hand signals.   Labs were completed and elevated  .1 on 8/22/22 noted.  He had a right BKA.   He denies n/v/d/c, fever, abdominal pain, HA, abnormal bleeding issues, edema. rash, or fatigue.         Past Medical History:   Past Medical History:   Diagnosis Date    Amputation of toe     Aphasia     CML (chronic myelocytic leukemia)     CVA (cerebral vascular accident)     History of craniotomy     Hypothyroidism, unspecified     Myelocytic leukemia     PAD (peripheral artery disease)     Subdural hematoma     Tobacco use        Past Surgical HIstory:   Past Surgical History:   Procedure Laterality Date    B/L PA with Stent Placement  08/26/2020    BONE MARROW BIOPSY  05/29/2009    C-Spine Surgery      FOOT SURGERY      INSERTION OF PERCUTANEOUS ENDOSCOPIC GASTROSTOMY (PEG) FEEDING TUBE  08/20/2019    and 07/25/2019    Left Craniotomy Hematoma Evacuation   07/06/2019    Right Below the Knee Amputation  05/29/2009    Savory Dilation  08/20/2019    TOE AMPUTATION  12/14/2020       Family History:   Family History   Problem Relation Age of Onset    Colon cancer Mother     Leukemia Paternal Grandfather     Colon cancer Maternal Aunt        Social History:  reports that he has never smoked. He has never used smokeless tobacco. He reports current alcohol use of about 7.0 standard drinks of alcohol per week. He reports that he does not use drugs.    Allergies:  Review of patient's allergies  indicates:   Allergen Reactions    Sprycel [dasatinib]        Medications:  Current Outpatient Medications   Medication Sig Dispense Refill    aspirin 81 MG Chew Take 81 mg by mouth once daily.      azelastine (ASTELIN) 137 mcg (0.1 %) nasal spray 2 sprays by Nasal route 2 (two) times a day.      bosutinib (BOSULIF) 500 mg Tab Take 500 mg by mouth once daily.      clopidogreL (PLAVIX) 75 mg tablet Take 75 mg by mouth once daily.      gabapentin (NEURONTIN) 100 MG capsule Take 100 mg by mouth 3 (three) times daily.      HYDROcodone-acetaminophen (NORCO)  mg per tablet TAKE ONE TABLET BY MOUTH EVERY FOUR HOURS AS NEEDED FOR PAIN 75 tablet 0    hydroxyurea (HYDREA) 500 mg Cap Take 1 capsule (500 mg total) by mouth 2 (two) times daily. 60 capsule 0    hydroxyurea (HYDREA) 500 mg Cap Take one capsule by mouth twice a day 60 capsule 2    isosorbide mononitrate (IMDUR) 30 MG 24 hr tablet Take 15 mg by mouth once daily.      levoFLOXacin (LEVAQUIN) 750 MG tablet Take 500 mg by mouth once daily.      metoprolol succinate 25 mg CSpX Take 25 mg by mouth once daily.      naproxen sodium 220 mg Cap Take 1 tablet by mouth every 6 (six) hours as needed.      ondansetron (ZOFRAN) 4 MG tablet Take 1 tablet (4 mg total) by mouth every 6 (six) hours as needed. 90 tablet 2    pravastatin (PRAVACHOL) 20 MG tablet Take 20 mg by mouth every evening.      sertraline (ZOLOFT) 25 MG tablet Take 25 mg by mouth once daily.       No current facility-administered medications for this visit.       Review of Systems   Constitutional: Positive for activity change, appetite change and fatigue.   HENT: Negative.    Eyes: Negative.    Respiratory: Negative.    Cardiovascular: Negative.    Gastrointestinal: Negative.    Endocrine: Negative.    Genitourinary: Negative.    Musculoskeletal: Positive for arthralgias, back pain and joint swelling.   Skin: Negative.    Allergic/Immunologic: Negative.    Neurological: Positive for facial  "asymmetry, speech difficulty and weakness.   Hematological: Negative.    Psychiatric/Behavioral: Negative.    All other systems reviewed and are negative.      ECOG Performance Status:   ECOG SCORE           Objective:      Vitals:   Vitals:    08/24/22 1045   BP: (!) 115/58   BP Location: Left arm   Patient Position: Sitting   Pulse: 61   Resp: 18   Temp: 96.8 °F (36 °C)   SpO2: 98%   Weight: 63.7 kg (140 lb 6.9 oz)   Height: 5' 11" (1.803 m)     BMI: Body mass index is 19.59 kg/m².    Physical Exam  Vitals and nursing note reviewed.   Constitutional:       General: He is awake.      Appearance: He is ill-appearing.   HENT:      Head: Normocephalic and atraumatic.      Nose: Nose normal.      Mouth/Throat:      Mouth: Mucous membranes are moist.   Eyes:      Conjunctiva/sclera: Conjunctivae normal.   Cardiovascular:      Rate and Rhythm: Normal rate and regular rhythm.      Heart sounds: Normal heart sounds.   Pulmonary:      Effort: Pulmonary effort is normal.      Breath sounds: Normal breath sounds.   Abdominal:      Palpations: Abdomen is soft.      Tenderness: There is no abdominal tenderness.   Musculoskeletal:         General: No tenderness.      Cervical back: Neck supple.      Comments: S/p right BKA  Left 3rd toe amputataion      Right Lower Extremity: Right leg is amputated above knee.   Skin:     Findings: No erythema or rash.   Neurological:      Mental Status: He is alert and oriented to person, place, and time. Mental status is at baseline.      Cranial Nerves: Cranial nerve deficit, dysarthria and facial asymmetry present.      Sensory: Sensory deficit: Left side weakness.      Motor: Weakness present.         Laboratory Data:  Lab Visit on 08/22/2022   Component Date Value Ref Range Status    Sodium Level 08/22/2022 132 (A) 136 - 145 mmol/L Final    Potassium Level 08/22/2022 4.9  3.5 - 5.1 mmol/L Final    Chloride 08/22/2022 98  98 - 107 mmol/L Final    Carbon Dioxide 08/22/2022 20 (A) 22 - 29 " mmol/L Final    Glucose Level 08/22/2022 61 (A) 74 - 100 mg/dL Final    Blood Urea Nitrogen 08/22/2022 18.0  8.4 - 25.7 mg/dL Final    Creatinine 08/22/2022 1.21 (A) 0.73 - 1.18 mg/dL Final    Calcium Level Total 08/22/2022 9.7  8.4 - 10.2 mg/dL Final    Protein Total 08/22/2022 7.8  6.4 - 8.3 gm/dL Final    Albumin Level 08/22/2022 4.2  3.5 - 5.0 gm/dL Final    Globulin 08/22/2022 3.6 (A) 2.4 - 3.5 gm/dL Final    Albumin/Globulin Ratio 08/22/2022 1.2  1.1 - 2.0 ratio Final    Bilirubin Total 08/22/2022 0.4  <=1.5 mg/dL Final    Alkaline Phosphatase 08/22/2022 139  40 - 150 unit/L Final    Alanine Aminotransferase 08/22/2022 16  0 - 55 unit/L Final    Aspartate Aminotransferase 08/22/2022 39 (A) 5 - 34 unit/L Final    eGFR 08/22/2022 >60  mls/min/1.73/m2 Final    Lactate Dehydrogenase 08/22/2022 1,286 (A) 125 - 220 U/L Final    WBC 08/22/2022 282.1 (A) 4.5 - 11.5 x10(3)/mcL Final    RBC 08/22/2022 4.56 (A) 4.70 - 6.10 x10(6)/mcL Final    Hgb 08/22/2022 11.5 (A) 14.0 - 18.0 gm/dL Final    Hct 08/22/2022 34.1 (A) 42.0 - 52.0 % Final    MCV 08/22/2022 74.8 (A) 80.0 - 94.0 fL Final    MCH 08/22/2022 25.2 (A) 27.0 - 31.0 pg Final    MCHC 08/22/2022 33.7  33.0 - 36.0 mg/dL Final    RDW 08/22/2022 19.9 (A) 11.5 - 17.0 % Final    Platelet 08/22/2022 196  130 - 400 x10(3)/mcL Final    MPV 08/22/2022 10.0  7.4 - 10.4 fL Final    IG# 08/22/2022 88.74 (A) 0 - 0.04 x10(3)/mcL Final    IG% 08/22/2022 31.5  % Final    Neut Man 08/22/2022 61  47 - 80 % Final    Band Neutrophil Man 08/22/2022 12 (A) 0 - 11 % Final    Lymph Man 08/22/2022 3 (A) 13 - 40 % Final    Monocyte Man 08/22/2022 4  2 - 11 % Final    [place] Man 08/22/2022 7  % Final    Myelo Man 08/22/2022 2  % Final    Promyelo Man 08/22/2022 2  % Final    Blasts Man 08/22/2022 9  % Final    Abs Neut calc 08/22/2022 262.353 (A) 2.1 - 9.2 x10(3)/mcL Final    Abs Mono 08/22/2022 11.284 (A) 0.1 - 1.3 x10(3)/mcL Final    Abs Lymp 08/22/2022  8.463 (A) 0.6 - 4.6 x10(3)/mcL Final    RBC Morph 08/22/2022 Abnormal (A) Normal Final    Poik 08/22/2022 Slight (A) (none) Final    Microcyte 08/22/2022 1+ (A) (none) Final    Hypochrom 08/22/2022 1+ (A) (none) Final    Target Cell 08/22/2022 Slight (A) (none) Final    Ovalocytes 08/22/2022 Slight (A) (none) Final    Platelet Est 08/22/2022 Adequate  Normal, Adequate Final    Peripheral Smear Evaluation 08/22/2022    Final                    Value:PERIPHERAL BLOOD:  Consistent with the patient's known history of chronic myelogenous leukemia, chronic phase.  No evidence of accelerated phase or blast transformation.  Red blood cells are normochromic and normocytic.  There are occasional nucleated red cells. Platelets are normal in number and morphology.  Dimitri Webb MD     Peripheral smear on 5/25/22: Borderline microcytic anemia, Adequate platelet population, Marked leukocytosis secondary to absolute neutrophilia, monocytosis, and basophilia with left-shifted granulocytic maturation. Rare (<1%) immature cells, consistent with blast identified.   Flow cytometry: There is phenotypic evidence of a granulocytic shift.   There peripheral smear and flow cytometry findings are worrisome for a myeloproliferative neoplasm,     Peripheral Smear on 8/22/22: Consistent with the patient's known history of chronic myelogenous leukemia, chronic phase.  No evidence of accelerated phase or blast transformation.  Red blood cells are normochromic and normocytic.  There are occasional nucleated red cells. Platelets are normal in number and morphology    Collected: 06/22/22 0850   Result status: Final   Resulting lab: [facility]   Value: SEE COMMENTS   Comment: Peripheral blood, BCR/ABL1 mRNA analysis qualitative:       Positive for BCR/ABL1 mRNA. The detected transcript   e13/e14-a2, which produces a p210 kD protein. The   quantitative level of BCR/ABL1 e13/e14-a2 transcripts were   detected above the limit of  quantitation, >55% IS total   ABL1 (%BCR/ABL1(p210):ABL1) (<MR 0.26).  NOTE: Please use   test ID: BCRAB for future testing to obtain quantitative   p210 BCR-ABL1 mRNA levels in this patient.  See COMMENT.       % BCRABL1 (p210):ABL1 in this assay is reported using the   International Scale (IS) (Ref:Anani M, et al.   Blood:[id number]; Digna RD, et al. J Mol Diagn   2013;15:565-576).       COMMENT: This patient has been previously followed by the   dedicated quantitative p210 BCR-ABL1 mRNA assay (BCRAB).     Consider additional testing for kinase domain mutations, if   clinically warranted (BAKDM).              Labs:   8/22/22: .1  8/19/22: .9  5/26/22: .7. Peripheral blood smear showed 4% blasts   4/26/22: WBC 87.06, Neutrophils 47.32, BCR- ABL positive  3/14/22: WBC 26.82, Neutrophils 11.3, Smear shows 2% blasts  1/14/22: WBC 5.26, Neutrophils 2.66, BCR-ABL positive  12/20/21: WBC 5.62, Neutrophils 1.81  11/18/21: WBC 6.7, BCR-ABL positive     - Occult Stool positive on 9/1/20    Assessment:     1. CML (chronic myelocytic leukemia) C92.10   Gleevec was discontinued due to rash many years ago. Recurrent Pleural effusions on sprycel in 12/2021 and Sprycel discontinued, effusions resolved. EF on ECHO 60%   BOSULIF 500mg po daily was started in 01/2022. However, WBC had rapidly increased with increased blasts % during Bosulif treatment from January 2022 to May 2022.  Patient most likely has resistant to Bosulif. WBC was 211,000; therefore stopped Bosulif and Hydrea was started initially 500 mg BID daily.  WBC 282K- keeps trending up with NO acute leukemia blasts per PBS. Positive BCR-ABL PCR analysis. Patient is currently taking Hydrea 2 tabs TID (total 3 gm daily) started on 8/19/22.    2. Anemia - stable H/H      Plan:  Follow up in 2 weeks with NP to go over labs  Follow up in 4 weeks with MD to evaluate tolerance of Hydrea  CBC,CMP, LDH, BCR/ABL Mutation, peripheral blood smear 1 week  prior to MD appt.  Continue Hydrea  2 tabs TID daily  CBC, CMP q week for 4 weeks       Problem List Items Addressed This Visit    None

## 2022-08-26 NOTE — TELEPHONE ENCOUNTER
Mr Armendariz is not my patient. He was seen in [place] before and again by Dr Nelson on 8/24/2022 and will continue followup there with labs, management and treatment there, [facility].     Elizabeth Castro MD

## 2022-08-29 LAB
BCR/ABL1 KINASE DOMAIN MUT ANL BLD/T: 210
PATH REPORT.FINAL DX SPEC: NORMAL
SPECIMEN SOURCE: NORMAL

## 2022-09-08 ENCOUNTER — OFFICE VISIT (OUTPATIENT)
Dept: HEMATOLOGY/ONCOLOGY | Facility: CLINIC | Age: 62
End: 2022-09-08
Payer: MEDICARE

## 2022-09-08 VITALS
HEART RATE: 79 BPM | TEMPERATURE: 98 F | RESPIRATION RATE: 18 BRPM | BODY MASS INDEX: 20 KG/M2 | DIASTOLIC BLOOD PRESSURE: 59 MMHG | HEIGHT: 71 IN | SYSTOLIC BLOOD PRESSURE: 110 MMHG | WEIGHT: 142.88 LBS | OXYGEN SATURATION: 99 %

## 2022-09-08 DIAGNOSIS — C92.10 CML (CHRONIC MYELOCYTIC LEUKEMIA): Primary | ICD-10-CM

## 2022-09-08 DIAGNOSIS — D63.0 ANEMIA IN NEOPLASTIC DISEASE: ICD-10-CM

## 2022-09-08 PROCEDURE — 99999 PR PBB SHADOW E&M-EST. PATIENT-LVL IV: ICD-10-PCS | Mod: PBBFAC,,, | Performed by: INTERNAL MEDICINE

## 2022-09-08 PROCEDURE — 99215 OFFICE O/P EST HI 40 MIN: CPT | Mod: S$PBB,,, | Performed by: INTERNAL MEDICINE

## 2022-09-08 PROCEDURE — 99215 PR OFFICE/OUTPT VISIT, EST, LEVL V, 40-54 MIN: ICD-10-PCS | Mod: S$PBB,,, | Performed by: INTERNAL MEDICINE

## 2022-09-08 PROCEDURE — 99214 OFFICE O/P EST MOD 30 MIN: CPT | Mod: PBBFAC | Performed by: INTERNAL MEDICINE

## 2022-09-08 PROCEDURE — 99999 PR PBB SHADOW E&M-EST. PATIENT-LVL IV: CPT | Mod: PBBFAC,,, | Performed by: INTERNAL MEDICINE

## 2022-09-08 NOTE — PROGRESS NOTES
PATIENT: Poli Armendariz .  MRN: [id number]  DATE: 9/8/2022    PCP: Sergey Andino MD  Cardiology: [facility]       Chief Complaint: CML (Pt reports joint pain and right hand spasms x1-2 months.)      Oncologic History:      Oncologic History CML:  5/29/2009 BMB   Hypercellular myeloid predominant marrow with left shifted myeloid maturation, and mild eosinophilia and basophilia, consistent with chronic myelogenous leukemia.   Flow cytometry-findings suggestive of left shifted myeloid maturation no evidence of acute leukemia, non-Hodgkin's lymphoma or plasma cell dyscrasia.   FISH for CML: BCR-ABL: BCR-ABL translocation was observed and 84.5% of cells analyzed using the BCR-ABL dual fusion probes, consistent with involvement by CML. Translocation of the ABL gene on chromosome 9 with the see BCR gene on chromosome 22 is consistent with diagnosis of chronic myelogenous leukemia   PCR for Matt to: No JAK2 mutation was detected by PCR.      Oncologic Treatment Gleevec since initial Dx. BCR-ABL 5% May or June 2018, not able to tolerate it, and Changed to Sprycel  Sprycel June 2018 - unclear when stopped  2019 admitted for multiple neurological issues prolong ICU stay, subdural hematoma.  Sprycel restarted 4/23/2020  Sprycel dose reduced 8/2021 due to pleural effusion.   sprycel 12/26/2021 stopped due to pleural effusion  CXR 1/21/2022 pleural effusion resolved--> plans for different medication; bosulif. Script sent 1/27/22 for Bosulif - Stopped on 6/1/22 for increased WBC  ECHO with EF 60%  Started Hydrea 500 mg BID 6/1/22---->8/19/22  Increased Hydrea (500 mg tab) taking 2 tabs TID (total 3 gm daily) started 8/19/22--->8/31/22  ECHO with EF 50%      Pathology           Subjective:   Interval History: Mr. Armendariz is a 61 y.o. male who returns for scheduled follow up visit with his son Kulwinder for f/u of CML. He was on Bosulif but stop it due to toxicities and side effects.  Last visit he was started on Hydrea twice times a day for  white blood cell counts over 200,000.  He was supposed to return in 2 weeks for toxicity check but he did not come. Son Kulwinder received call from Dr. Castro's nurse on 8/19/22 that instructed patient to increase Hydrea to 2 tabs TID related to increase in .9. Son is in charge of given his the medication and he reports that he give the medications to his father as prescribed.  Patient is tolerating relatively fine.  He does report bone pain for which he takes ibuprofen and norco, reports it does help with pain. Patient has difficulty speaking and express himself with hand signals.   Labs were completed and elevated  .1 on 8/22/22 noted.  He had a right BKA.  He denies n/v/d/c, fever, abdominal pain, HA, abnormal bleeding issues, edema. rash, or fatigue.     9/8/22: Patient is here today in clinic because of non compliance of Hydrea. Patient's son Kulwinder present in room. Patient reports that he took Hydrea when he was first prescribed it but hasn't taken it for at least 2 months. Son Kulwinder stated he is for sure knows the patient has been throwing away medication since 8/31/22.        Past Medical History:   Past Medical History:   Diagnosis Date    Amputation of toe     Aphasia     CML (chronic myelocytic leukemia)     CVA (cerebral vascular accident)     History of craniotomy     Hypothyroidism, unspecified     Myelocytic leukemia     PAD (peripheral artery disease)     Subdural hematoma     Tobacco use        Past Surgical HIstory:   Past Surgical History:   Procedure Laterality Date    B/L PA with Stent Placement  08/26/2020    BONE MARROW BIOPSY  05/29/2009    C-Spine Surgery      FOOT SURGERY      INSERTION OF PERCUTANEOUS ENDOSCOPIC GASTROSTOMY (PEG) FEEDING TUBE  08/20/2019    and 07/25/2019    Left Craniotomy Hematoma Evacuation   07/06/2019    Right Below the Knee Amputation  05/29/2009    Savory Dilation  08/20/2019    TOE AMPUTATION  12/14/2020       Family History:   Family History    Problem Relation Age of Onset    Colon cancer Mother     Leukemia Paternal Grandfather     Colon cancer Maternal Aunt        Social History:  reports that he has never smoked. He has never used smokeless tobacco. He reports current alcohol use of about 7.0 standard drinks per week. He reports that he does not use drugs.    Allergies:  Review of patient's allergies indicates:   Allergen Reactions    Sprycel [dasatinib]        Medications:  Current Outpatient Medications   Medication Sig Dispense Refill    aspirin 81 MG Chew Take 81 mg by mouth once daily.      azelastine (ASTELIN) 137 mcg (0.1 %) nasal spray 2 sprays by Nasal route 2 (two) times a day.      bosutinib (BOSULIF) 500 mg Tab Take 500 mg by mouth once daily.      clopidogreL (PLAVIX) 75 mg tablet Take 75 mg by mouth once daily.      gabapentin (NEURONTIN) 100 MG capsule Take 100 mg by mouth 3 (three) times daily.      HYDROcodone-acetaminophen (NORCO)  mg per tablet TAKE ONE TABLET BY MOUTH EVERY FOUR HOURS AS NEEDED FOR PAIN 75 tablet 0    hydroxyurea (HYDREA) 500 mg Cap Take 2 capsules (1,000 mg total) by mouth 3 (three) times daily. 180 capsule 0    isosorbide mononitrate (IMDUR) 30 MG 24 hr tablet Take 15 mg by mouth once daily.      levoFLOXacin (LEVAQUIN) 750 MG tablet Take 500 mg by mouth once daily.      metoprolol succinate 25 mg CSpX Take 25 mg by mouth once daily.      naproxen sodium 220 mg Cap Take 1 tablet by mouth every 6 (six) hours as needed.      ondansetron (ZOFRAN) 4 MG tablet Take 1 tablet (4 mg total) by mouth every 6 (six) hours as needed. 90 tablet 2    pravastatin (PRAVACHOL) 20 MG tablet Take 20 mg by mouth every evening.      sertraline (ZOLOFT) 25 MG tablet Take 25 mg by mouth once daily.       No current facility-administered medications for this visit.       Review of Systems   Constitutional: Positive for activity change, appetite change and fatigue.   HENT: Negative.    Eyes: Negative.    Respiratory: Negative.     Cardiovascular: Negative.    Gastrointestinal: Negative.    Endocrine: Negative.    Genitourinary: Negative.    Musculoskeletal: Positive for arthralgias, back pain and joint swelling.   Skin: Negative.    Allergic/Immunologic: Negative.    Neurological: Positive for facial asymmetry, speech difficulty and weakness.   Hematological: Negative.    Psychiatric/Behavioral: Negative.    All other systems reviewed and are negative.      ECOG Performance Status:   ECOG SCORE             Objective:      Vitals:   There were no vitals filed for this visit.    BMI: There is no height or weight on file to calculate BMI.    Physical Exam  Vitals and nursing note reviewed.   Constitutional:       General: He is awake.        HENT:      Head: Normocephalic and atraumatic.      Nose: Nose normal.      Mouth/Throat:      Mouth: Mucous membranes are moist.   Eyes:      Conjunctiva/sclera: Conjunctivae normal.   Cardiovascular:      Rate and Rhythm: Normal rate and regular rhythm.      Heart sounds: Normal heart sounds.   Pulmonary:      Effort: Pulmonary effort is normal.      Breath sounds: Normal breath sounds.   Abdominal:      Palpations: Abdomen is soft.      Tenderness: There is no abdominal tenderness.   Musculoskeletal:         General: No tenderness.      Cervical back: Neck supple.      Comments: S/p right BKA  Left 3rd toe amputataion      Right Lower Extremity: Right leg is amputated above knee.   Skin:     Findings: No erythema or rash.   Neurological:      Mental Status: He is alert and oriented to person, place, and time. Mental status is at baseline.      Cranial Nerves: Cranial nerve deficit, dysarthria and facial asymmetry present.      Sensory: Sensory deficit: Left side weakness.      Motor: Weakness present.         Laboratory Data:  No visits with results within 1 Week(s) from this visit.   Latest known visit with results is:   Lab Visit on 08/22/2022   Component Date Value Ref Range Status    Sodium Level  08/22/2022 132 (L)  136 - 145 mmol/L Final    Potassium Level 08/22/2022 4.9  3.5 - 5.1 mmol/L Final    Chloride 08/22/2022 98  98 - 107 mmol/L Final    Carbon Dioxide 08/22/2022 20 (L)  22 - 29 mmol/L Final    Glucose Level 08/22/2022 61 (L)  74 - 100 mg/dL Final    Blood Urea Nitrogen 08/22/2022 18.0  8.4 - 25.7 mg/dL Final    Creatinine 08/22/2022 1.21 (H)  0.73 - 1.18 mg/dL Final    Calcium Level Total 08/22/2022 9.7  8.4 - 10.2 mg/dL Final    Protein Total 08/22/2022 7.8  6.4 - 8.3 gm/dL Final    Albumin Level 08/22/2022 4.2  3.5 - 5.0 gm/dL Final    Globulin 08/22/2022 3.6 (H)  2.4 - 3.5 gm/dL Final    Albumin/Globulin Ratio 08/22/2022 1.2  1.1 - 2.0 ratio Final    Bilirubin Total 08/22/2022 0.4  <=1.5 mg/dL Final    Alkaline Phosphatase 08/22/2022 139  40 - 150 unit/L Final    Alanine Aminotransferase 08/22/2022 16  0 - 55 unit/L Final    Aspartate Aminotransferase 08/22/2022 39 (H)  5 - 34 unit/L Final    eGFR 08/22/2022 >60  mls/min/1.73/m2 Final    Lactate Dehydrogenase 08/22/2022 1,286 (H)  125 - 220 U/L Final    WBC 08/22/2022 282.1 (HH)  4.5 - 11.5 x10(3)/mcL Final    RBC 08/22/2022 4.56 (L)  4.70 - 6.10 x10(6)/mcL Final    Hgb 08/22/2022 11.5 (L)  14.0 - 18.0 gm/dL Final    Hct 08/22/2022 34.1 (L)  42.0 - 52.0 % Final    MCV 08/22/2022 74.8 (L)  80.0 - 94.0 fL Final    MCH 08/22/2022 25.2 (L)  27.0 - 31.0 pg Final    MCHC 08/22/2022 33.7  33.0 - 36.0 mg/dL Final    RDW 08/22/2022 19.9 (H)  11.5 - 17.0 % Final    Platelet 08/22/2022 196  130 - 400 x10(3)/mcL Final    MPV 08/22/2022 10.0  7.4 - 10.4 fL Final    IG# 08/22/2022 88.74 (H)  0 - 0.04 x10(3)/mcL Final    IG% 08/22/2022 31.5  % Final    Neut Man 08/22/2022 61  47 - 80 % Final    Band Neutrophil Man 08/22/2022 12 (H)  0 - 11 % Final    Lymph Man 08/22/2022 3 (L)  13 - 40 % Final    Monocyte Man 08/22/2022 4  2 - 11 % Final    Vance Man 08/22/2022 7  % Final    Myelo Man 08/22/2022 2  % Final    Promyelo Man 08/22/2022 2  % Final    Blasts Man  08/22/2022 9  % Final    Abs Neut calc 08/22/2022 262.353 (H)  2.1 - 9.2 x10(3)/mcL Final    Abs Mono 08/22/2022 11.284 (H)  0.1 - 1.3 x10(3)/mcL Final    Abs Lymp 08/22/2022 8.463 (H)  0.6 - 4.6 x10(3)/mcL Final    RBC Morph 08/22/2022 Abnormal (A)  Normal Final    Poik 08/22/2022 Slight (A)  (none) Final    Microcyte 08/22/2022 1+ (A)  (none) Final    Hypochrom 08/22/2022 1+ (A)  (none) Final    Target Cell 08/22/2022 Slight (A)  (none) Final    Ovalocytes 08/22/2022 Slight (A)  (none) Final    Platelet Est 08/22/2022 Adequate  Normal, Adequate Final    Peripheral Smear Evaluation 08/22/2022    Final                    Value:PERIPHERAL BLOOD:  Consistent with the patient's known history of chronic myelogenous leukemia, chronic phase.  No evidence of accelerated phase or blast transformation.  Red blood cells are normochromic and normocytic.  There are occasional nucleated red cells. Platelets are normal in number and morphology.  Dimitri Webb MD     Peripheral smear on 5/25/22: Borderline microcytic anemia, Adequate platelet population, Marked leukocytosis secondary to absolute neutrophilia, monocytosis, and basophilia with left-shifted granulocytic maturation. Rare (<1%) immature cells, consistent with blast identified.   Flow cytometry: There is phenotypic evidence of a granulocytic shift.   There peripheral smear and flow cytometry findings are worrisome for a myeloproliferative neoplasm,     Peripheral Smear on 8/22/22: Consistent with the patient's known history of chronic myelogenous leukemia, chronic phase.  No evidence of accelerated phase or blast transformation.  Red blood cells are normochromic and normocytic.  There are occasional nucleated red cells. Platelets are normal in number and morphology    Collected: 06/22/22 0850   Result status: Final   Resulting lab: [facility]   Value: SEE COMMENTS   Comment: Peripheral blood, BCR/ABL1 mRNA analysis qualitative:       Positive for BCR/ABL1 mRNA.  The detected transcript   e13/e14-a2, which produces a p210 kD protein. The   quantitative level of BCR/ABL1 e13/e14-a2 transcripts were   detected above the limit of quantitation, >55% IS total   ABL1 (%BCR/ABL1(p210):ABL1) (<MR 0.26).  NOTE: Please use   test ID: BCRAB for future testing to obtain quantitative   p210 BCR-ABL1 mRNA levels in this patient.  See COMMENT.       % BCRABL1 (p210):ABL1 in this assay is reported using the   International Scale (IS) (Ref:Jocarani M, et al.   Blood:[id number]; Digna RD, et al. J Mol Diagn   2013;15:565-576).       COMMENT: This patient has been previously followed by the   dedicated quantitative p210 BCR-ABL1 mRNA assay (BCRAB).     Consider additional testing for kinase domain mutations, if   clinically warranted (BAKDM).              Labs:   8/22/22: .1  8/19/22: .9  5/26/22: .7. Peripheral blood smear showed 4% blasts   4/26/22: WBC 87.06, Neutrophils 47.32, BCR- ABL positive  3/14/22: WBC 26.82, Neutrophils 11.3, Smear shows 2% blasts  1/14/22: WBC 5.26, Neutrophils 2.66, BCR-ABL positive  12/20/21: WBC 5.62, Neutrophils 1.81  11/18/21: WBC 6.7, BCR-ABL positive     - Occult Stool positive on 9/1/20    Assessment:     1. CML (chronic myelocytic leukemia) C92.10   Gleevec was discontinued due to rash many years ago. Recurrent Pleural effusions on sprycel in 12/2021 and Sprycel discontinued, effusions resolved. EF on ECHO 60%   BOSULIF 500mg po daily was started in 01/2022. However, WBC had rapidly increased with increased blasts % during Bosulif treatment from January 2022 to May 2022.  Patient most likely has resistant to Bosulif. WBC was 211,000; therefore stopped Bosulif and Hydrea was started initially 500 mg BID daily.  WBC 282K- keeps trending up with NO acute leukemia blasts per PBS. Positive BCR-ABL PCR analysis. Patient is currently taking Hydrea 2 tabs TID (total 3 gm daily) started on 8/19/22.    9/8/22: Patient had not been taking his  Hydrea since last 2 months. Patient is expressing that he does not want to continue with Hydrea therapy. I explained to the patient and his son that patient's CML would get worse with blasts crisis if he is not taking Hydrea. I also discussed that palliative care is the next option if patient dose not want to be treated further with chemotherapy. Patient's son wants to take time to speak with patient at home on further course of action. Labs reviewed and discussed with patient and his son regarding WBC has been rapidly increasing.    2. Anemia - stable H/H      Plan:  RTC in 2 weeks with MD with labs for reassessment  Recommend to continue taking Hydrea   CBC, CMP, PBS, LDH, Flow Cytometry     Problem List Items Addressed This Visit    None

## 2022-09-14 NOTE — PROGRESS NOTES
PATIENT: Poli Armendariz .  MRN: [id number]  DATE: 9/26/2022    PCP: Sergey Andino MD  Cardiology: [facility]       Chief Complaint: Leukemia and CML (3 week f/u with labs-- Patient reports no new concerns, patient is able to use prosthetic right leg now to walk with walker)      Oncologic History:      Oncologic History CML:  5/29/2009 BMB   Hypercellular myeloid predominant marrow with left shifted myeloid maturation, and mild eosinophilia and basophilia, consistent with chronic myelogenous leukemia.   Flow cytometry-findings suggestive of left shifted myeloid maturation no evidence of acute leukemia, non-Hodgkin's lymphoma or plasma cell dyscrasia.   FISH for CML: BCR-ABL: BCR-ABL translocation was observed and 84.5% of cells analyzed using the BCR-ABL dual fusion probes, consistent with involvement by CML. Translocation of the ABL gene on chromosome 9 with the see BCR gene on chromosome 22 is consistent with diagnosis of chronic myelogenous leukemia   PCR for Matt to: No JAK2 mutation was detected by PCR.      Oncologic Treatment Gleevec since initial Dx. BCR-ABL 5% May or June 2018, not able to tolerate it, and Changed to Sprycel  Sprycel June 2018 - unclear when stopped  2019 admitted for multiple neurological issues prolong ICU stay, subdural hematoma.  Sprycel restarted 4/23/2020  Sprycel dose reduced 8/2021 due to pleural effusion.   sprycel 12/26/2021 stopped due to pleural effusion  CXR 1/21/2022 pleural effusion resolved--> plans for different medication; bosulif. Script sent 1/27/22 for Bosulif - Stopped on 6/1/22 for increased WBC  ECHO with EF 60%  Started Hydrea 500 mg BID 6/1/22---->8/19/22  Increased Hydrea (500 mg tab) taking 2 tabs TID (total 3 gm daily) started 8/19/22--->8/31/22  ECHO with EF 50%      Pathology           Subjective:   Interval History: Mr. Armendariz is a 61 y.o. male who returns for scheduled follow up visit with his son Kulwinder for f/u of CML.     He was on Bosulif but stop it due to  toxicities and worsening disease. He was then started on HU twice times a day for white blood cell counts over 200,000. He was noted to have increase in counts and was called by Dr. Castro's nurse on 8/19/22 to increase Hydrea to 2 tabs TID related to increase in .9. Pt has not been taking medications for more than 2 months as it made him feels nauseous.     Patient has difficulty speaking and express himself with hand signals. Discussed with him about his counts and compliance. Also explained to him about starting him on asciminib but pt is adamant that he does not want to take any medications anymore. Informed him of his disease progression and can lead to death if he is not compliant. According to the son, whole family will try to convince him again to start the new medication. He want me to sent the prescription and he will then work with the family to help him start the father on the medication.             Past Medical History:   Past Medical History:   Diagnosis Date    Amputation of toe     Aphasia     CML (chronic myelocytic leukemia)     CVA (cerebral vascular accident)     History of craniotomy     Hypothyroidism, unspecified     Myelocytic leukemia     PAD (peripheral artery disease)     Subdural hematoma     Tobacco use        Past Surgical HIstory:   Past Surgical History:   Procedure Laterality Date    B/L PA with Stent Placement  08/26/2020    BONE MARROW BIOPSY  05/29/2009    C-Spine Surgery      FOOT SURGERY      INSERTION OF PERCUTANEOUS ENDOSCOPIC GASTROSTOMY (PEG) FEEDING TUBE  08/20/2019    and 07/25/2019    Left Craniotomy Hematoma Evacuation   07/06/2019    Right Below the Knee Amputation  05/29/2009    Savory Dilation  08/20/2019    TOE AMPUTATION  12/14/2020       Family History:   Family History   Problem Relation Age of Onset    Colon cancer Mother     Leukemia Paternal Grandfather     Colon cancer Maternal Aunt        Social History:  reports that he has never smoked. He has  never used smokeless tobacco. He reports current alcohol use of about 7.0 standard drinks per week. He reports that he does not use drugs.    Allergies:  Review of patient's allergies indicates:   Allergen Reactions    Dasatinib      Other reaction(s): Unknown       Medications:  Current Outpatient Medications   Medication Sig Dispense Refill    aspirin 81 MG Chew Take 81 mg by mouth once daily.      atorvastatin (LIPITOR) 10 MG tablet Take 10 mg by mouth once daily.      azelastine (ASTELIN) 137 mcg (0.1 %) nasal spray 2 sprays by Nasal route 2 (two) times a day.      clopidogreL (PLAVIX) 75 mg tablet Take 75 mg by mouth once daily.      gabapentin (NEURONTIN) 100 MG capsule Take 100 mg by mouth 3 (three) times daily.      HYDROcodone-acetaminophen (NORCO)  mg per tablet TAKE ONE TABLET BY MOUTH EVERY FOUR HOURS AS NEEDED FOR PAIN 75 tablet 0    isosorbide mononitrate (IMDUR) 30 MG 24 hr tablet Take 15 mg by mouth once daily.      levETIRAcetam (KEPPRA) 500 MG Tab Take 1,500 mg by mouth 2 (two) times daily.      levoFLOXacin (LEVAQUIN) 750 MG tablet Take 500 mg by mouth once daily.      metoprolol succinate 25 mg CSpX Take 25 mg by mouth once daily.      naproxen sodium 220 mg Cap Take 1 tablet by mouth every 6 (six) hours as needed.      ondansetron (ZOFRAN) 4 MG tablet Take 1 tablet (4 mg total) by mouth every 6 (six) hours as needed. 90 tablet 2    pravastatin (PRAVACHOL) 20 MG tablet Take 20 mg by mouth every evening.      sertraline (ZOLOFT) 25 MG tablet Take 25 mg by mouth once daily.      hydroxyurea (HYDREA) 500 mg Cap Take 2 capsules (1,000 mg total) by mouth 3 (three) times daily. (Patient not taking: Reported on 9/26/2022.) 180 capsule 2     No current facility-administered medications for this visit.       Review of Systems   Constitutional: Positive for activity change, appetite change and fatigue.   HENT: Negative.    Eyes: Negative.    Respiratory: Negative.    Cardiovascular: Negative.   "  Gastrointestinal: Negative.    Endocrine: Negative.    Genitourinary: Negative.    Musculoskeletal: Positive for arthralgias, back pain and joint swelling.   Skin: Negative.    Allergic/Immunologic: Negative.    Neurological: Positive for facial asymmetry, speech difficulty and weakness.   Hematological: Negative.    Psychiatric/Behavioral: Negative.    All other systems reviewed and are negative.      ECOG Performance Status:   ECOG SCORE             Objective:      Vitals:   Vitals:    09/26/22 0914   BP: 97/68   BP Location: Left arm   Pulse: 83   Resp: 18   Temp: 97.5 °F (36.4 °C)   SpO2: 97%   Weight: 68.5 kg (151 lb)   Height: 5' 10.98" (1.803 m)       BMI: Body mass index is 21.07 kg/m².    Physical Exam  Vitals and nursing note reviewed.   Constitutional:       General: He is awake.        HENT:      Head: Normocephalic and atraumatic.      Nose: Nose normal.      Mouth/Throat:      Mouth: Mucous membranes are moist.   Eyes:      Conjunctiva/sclera: Conjunctivae normal.   Cardiovascular:      Rate and Rhythm: Normal rate and regular rhythm.      Heart sounds: Normal heart sounds.   Pulmonary:      Effort: Pulmonary effort is normal.      Breath sounds: Normal breath sounds.   Abdominal:      Palpations: Abdomen is soft.      Tenderness: There is no abdominal tenderness.   Musculoskeletal:         General: No tenderness.      Cervical back: Neck supple.      Comments: S/p right BKA  Left 3rd toe amputataion      Right Lower Extremity: Right leg is amputated above knee.   Skin:     Findings: No erythema or rash.   Neurological:      Mental Status: He is alert and oriented to person, place, and time. Mental status is at baseline.      Cranial Nerves: Cranial nerve deficit, dysarthria and facial asymmetry present.      Sensory: Sensory deficit: Left side weakness.      Motor: Weakness present.         Laboratory Data:  Lab Visit on 09/22/2022   Component Date Value Ref Range Status    Sodium Level 09/22/2022 " 133 (L)  136 - 145 mmol/L Final    Potassium Level 09/22/2022 3.8  3.5 - 5.1 mmol/L Final    Chloride 09/22/2022 99  98 - 107 mmol/L Final    Carbon Dioxide 09/22/2022 22  22 - 29 mmol/L Final    Glucose Level 09/22/2022 93  74 - 100 mg/dL Final    Blood Urea Nitrogen 09/22/2022 23.0  8.4 - 25.7 mg/dL Final    Creatinine 09/22/2022 1.60 (H)  0.73 - 1.18 mg/dL Final    Calcium Level Total 09/22/2022 9.2  8.4 - 10.2 mg/dL Final    Protein Total 09/22/2022 7.3  6.4 - 8.3 gm/dL Final    Albumin Level 09/22/2022 4.1  3.5 - 5.0 gm/dL Final    Globulin 09/22/2022 3.2  2.4 - 3.5 gm/dL Final    Albumin/Globulin Ratio 09/22/2022 1.3  1.1 - 2.0 ratio Final    Bilirubin Total 09/22/2022 0.3  <=1.5 mg/dL Final    Alkaline Phosphatase 09/22/2022 128  40 - 150 unit/L Final    Alanine Aminotransferase 09/22/2022 18  0 - 55 unit/L Final    Aspartate Aminotransferase 09/22/2022 34  5 - 34 unit/L Final    eGFR 09/22/2022 49  mls/min/1.73/m2 Final    Lactate Dehydrogenase 09/22/2022 1,348 (H)  125 - 220 U/L Final    Peripheral Smear Evaluation 09/22/2022    Final                    Value:Leukocytosis with immature granulocytic precursors (<2% blasts) consistent with a myeloproliferative neoplasm, most likely CML.  BCR-ABL testing is recommended for definitive diagnosis.  The case was reviewed in intradepartmental consultation.  Kavin Scott MD      WBC 09/22/2022 400.8 (HH)  4.5 - 11.5 x10(3)/mcL Final    RBC 09/22/2022 3.92 (L)  4.70 - 6.10 x10(6)/mcL Final    Hgb 09/22/2022 9.4 (L)  14.0 - 18.0 gm/dL Final    Hct 09/22/2022 29.7 (L)  42.0 - 52.0 % Final    MCV 09/22/2022 75.8 (L)  80.0 - 94.0 fL Final    MCH 09/22/2022 24.0 (L)  27.0 - 31.0 pg Final    MCHC 09/22/2022 31.6 (L)  33.0 - 36.0 mg/dL Final    RDW 09/22/2022 20.6 (H)  11.5 - 17.0 % Final    Platelet 09/22/2022 134  130 - 400 x10(3)/mcL Final    MPV 09/22/2022 9.8  7.4 - 10.4 fL Final    IG# 09/22/2022 138.40 (H)  0 - 0.04 x10(3)/mcL Final    IG% 09/22/2022 34.5  % Final     Neut Man 09/22/2022 50  47 - 80 % Final    Band Neutrophil Man 09/22/2022 14 (H)  0 - 11 % Final    Lymph Man 09/22/2022 3 (L)  13 - 40 % Final    Monocyte Man 09/22/2022 3  2 - 11 % Final    Eos Man 09/22/2022 9 (H)  0 - 8 % Final    [place] Man 09/22/2022 5  % Final    Myelo Man 09/22/2022 3  % Final    Blasts Man 09/22/2022 11  % Final    Prolymph Man 09/22/2022 2  % Final    NRBC Man 09/22/2022 1  % Final    Abs Neut calc 09/22/2022 332.664 (H)  2.1 - 9.2 x10(3)/mcL Final    Abs Mono 09/22/2022 12.024 (H)  0.1 - 1.3 x10(3)/mcL Final    Abs Lymp 09/22/2022 12.024 (H)  0.6 - 4.6 x10(3)/mcL Final    Abs Eos  09/22/2022 36.072 (H)  0 - 0.9 x10(3)/mcL Final    RBC Morph 09/22/2022 Abnormal (A)  Normal Final    Poik 09/22/2022 Slight (A)  (none) Final    Hypochrom 09/22/2022 Slight (A)  (none) Final    Ovalocytes 09/22/2022 Slight (A)  (none) Final    Platelet Est 09/22/2022 Adequate  Normal, Adequate Final     Peripheral smear on 5/25/22: Borderline microcytic anemia, Adequate platelet population, Marked leukocytosis secondary to absolute neutrophilia, monocytosis, and basophilia with left-shifted granulocytic maturation. Rare (<1%) immature cells, consistent with blast identified.   Flow cytometry: There is phenotypic evidence of a granulocytic shift.   There peripheral smear and flow cytometry findings are worrisome for a myeloproliferative neoplasm,     Peripheral Smear on 8/22/22: Consistent with the patient's known history of chronic myelogenous leukemia, chronic phase.  No evidence of accelerated phase or blast transformation.  Red blood cells are normochromic and normocytic.  There are occasional nucleated red cells. Platelets are normal in number and morphology    Collected: 06/22/22 0850   Result status: Final   Resulting lab: [facility]   Value: SEE COMMENTS   Comment: Peripheral blood, BCR/ABL1 mRNA analysis qualitative:       Positive for BCR/ABL1 mRNA. The detected transcript   e13/e14-a2, which  produces a p210 kD protein. The   quantitative level of BCR/ABL1 e13/e14-a2 transcripts were   detected above the limit of quantitation, >55% IS total   ABL1 (%BCR/ABL1(p210):ABL1) (<MR 0.26).  NOTE: Please use   test ID: BCRAB for future testing to obtain quantitative   p210 BCR-ABL1 mRNA levels in this patient.  See COMMENT.       % BCRABL1 (p210):ABL1 in this assay is reported using the   International Scale (IS) (Ref:Jocarani M, et al.   Blood:[id number]; Digna RD, et al. J Mol Diagn   2013;15:565-576).       COMMENT: This patient has been previously followed by the   dedicated quantitative p210 BCR-ABL1 mRNA assay (BCRAB).     Consider additional testing for kinase domain mutations, if   clinically warranted (BAKDM).              Labs:   8/22/22: .1  8/19/22: .9  5/26/22: .7. Peripheral blood smear showed 4% blasts   4/26/22: WBC 87.06, Neutrophils 47.32, BCR- ABL positive  3/14/22: WBC 26.82, Neutrophils 11.3, Smear shows 2% blasts  1/14/22: WBC 5.26, Neutrophils 2.66, BCR-ABL positive  12/20/21: WBC 5.62, Neutrophils 1.81  11/18/21: WBC 6.7, BCR-ABL positive     - Occult Stool positive on 9/1/20    Assessment:     1. CML (chronic myelocytic leukemia) C92.10  - Gleevec discontinued due to rash many years ago.   - Recurrent Pleural effusions on sprycel in 12/2021 and Sprycel discontinued, effusions resolved. EF on ECHO 60%   - BOSULIF 500mg po daily was started in 01/2022. However, WBC rapidly increased with increased blasts % during Bosulif treatment from January 2022 to May 2022; therefore stopped Bosulif and Hydrea was started initially 500 mg BID daily.  - WBC 282K- keeps trending up with NO acute leukemia blasts per PBS. Positive BCR-ABL PCR analysis. Patient has never been complaint with Hydrea   - Peripheral smear 8/22/22: Consistent with chronic myelogenous leukemia, chronic phase.  No evidence of accelerated phase or blast transformation.    - 9/8/22: Patient had not been taking  his Hydrea since last 2 months. Patient is expressing that he does not want to continue with Hydrea therapy.   - 09/26/2022: Pt not taking HU and now even refused to get any treatment at all despite explained the risks with progression of the disease, which can lead to death. Son requested to sent the prescription and then with whole family, he would convince the father to start taking the new medication.   - BCR/ABL mutation sequencing 8/24/22: Negative   - Prescribed Asciminib 40 mg bid as failed / intolerant to three TKIs. Asked pt not to take it before see us on the follow up visit     2. Anemia   - stable H/H      Plan:    - Prescribed Asciminib 40 mg bid. Asked pt not to take it before see us on the follow up visit     - Flow Cytometry pending       RTC in 2 weeks with MD with labs for reassessment       Problem List Items Addressed This Visit    None  Visit Diagnoses       CML (chronic myelocytic leukemia)    -  Primary    Relevant Orders    CBC Auto Differential    Comprehensive Metabolic Panel

## 2022-09-15 DIAGNOSIS — C92.10 CML (CHRONIC MYELOCYTIC LEUKEMIA): Primary | ICD-10-CM

## 2022-09-15 RX ORDER — HYDROXYUREA 500 MG/1
1000 CAPSULE ORAL 3 TIMES DAILY
Qty: 180 CAPSULE | Refills: 2 | Status: SHIPPED | OUTPATIENT
Start: 2022-09-15 | End: 2022-09-27

## 2022-09-22 ENCOUNTER — LAB VISIT (OUTPATIENT)
Dept: LAB | Facility: HOSPITAL | Age: 62
End: 2022-09-22
Attending: INTERNAL MEDICINE
Payer: MEDICARE

## 2022-09-22 DIAGNOSIS — C92.10 CML (CHRONIC MYELOCYTIC LEUKEMIA): ICD-10-CM

## 2022-09-22 LAB
ABS NEUT CALC (OHS): 332.66 X10(3)/MCL (ref 2.1–9.2)
ALBUMIN SERPL-MCNC: 4.1 GM/DL (ref 3.5–5)
ALBUMIN/GLOB SERPL: 1.3 RATIO (ref 1.1–2)
ALP SERPL-CCNC: 128 UNIT/L (ref 40–150)
ALT SERPL-CCNC: 18 UNIT/L (ref 0–55)
AST SERPL-CCNC: 34 UNIT/L (ref 5–34)
B-HCG SERPL QL: 11 %
BILIRUBIN DIRECT+TOT PNL SERPL-MCNC: 0.3 MG/DL
BUN SERPL-MCNC: 23 MG/DL (ref 8.4–25.7)
CALCIUM SERPL-MCNC: 9.2 MG/DL (ref 8.4–10.2)
CHLORIDE SERPL-SCNC: 99 MMOL/L (ref 98–107)
CO2 SERPL-SCNC: 22 MMOL/L (ref 22–29)
CREAT SERPL-MCNC: 1.6 MG/DL (ref 0.73–1.18)
EOSINOPHIL NFR BLD MANUAL: 36.07 X10(3)/MCL (ref 0–0.9)
EOSINOPHIL NFR BLD MANUAL: 9 % (ref 0–8)
ERYTHROCYTE [DISTWIDTH] IN BLOOD BY AUTOMATED COUNT: 20.6 % (ref 11.5–17)
GFR SERPLBLD CREATININE-BSD FMLA CKD-EPI: 49 MLS/MIN/1.73/M2
GLOBULIN SER-MCNC: 3.2 GM/DL (ref 2.4–3.5)
GLUCOSE SERPL-MCNC: 93 MG/DL (ref 74–100)
HCT VFR BLD AUTO: 29.7 % (ref 42–52)
HGB BLD-MCNC: 9.4 GM/DL (ref 14–18)
HYPOCHROMIA BLD QL SMEAR: SLIGHT
IMM GRANULOCYTES # BLD AUTO: 138.4 X10(3)/MCL (ref 0–0.04)
IMM GRANULOCYTES NFR BLD AUTO: 34.5 %
LDH SERPL-CCNC: 1348 U/L (ref 125–220)
LYMPHOCYTES NFR BLD MANUAL: 12.02 X10(3)/MCL
LYMPHOCYTES NFR BLD MANUAL: 3 % (ref 13–40)
MCH RBC QN AUTO: 24 PG (ref 27–31)
MCHC RBC AUTO-ENTMCNC: 31.6 MG/DL (ref 33–36)
MCV RBC AUTO: 75.8 FL (ref 80–94)
METAMYELOCYTES NFR BLD MANUAL: 5 %
MONOCYTES NFR BLD MANUAL: 12.02 X10(3)/MCL (ref 0.1–1.3)
MONOCYTES NFR BLD MANUAL: 3 % (ref 2–11)
MYELOCYTES NFR BLD MANUAL: 3 %
NEUTROPHILS NFR BLD MANUAL: 50 % (ref 47–80)
NEUTS BAND NFR BLD MANUAL: 14 % (ref 0–11)
NRBC BLD MANUAL-RTO: 1 %
OVALOCYTES (OLG): SLIGHT
PLATELET # BLD AUTO: 134 X10(3)/MCL (ref 130–400)
PLATELET # BLD EST: ADEQUATE 10*3/UL
PMV BLD AUTO: 9.8 FL (ref 7.4–10.4)
POIKILOCYTOSIS BLD QL SMEAR: SLIGHT
POTASSIUM SERPL-SCNC: 3.8 MMOL/L (ref 3.5–5.1)
PROLYMPHOCYTES # BLD MANUAL: 2 %
PROT SERPL-MCNC: 7.3 GM/DL (ref 6.4–8.3)
RBC # BLD AUTO: 3.92 X10(6)/MCL (ref 4.7–6.1)
RBC MORPH BLD: ABNORMAL
SODIUM SERPL-SCNC: 133 MMOL/L (ref 136–145)
WBC # SPEC AUTO: 400.8 X10(3)/MCL (ref 4.5–11.5)

## 2022-09-22 PROCEDURE — 88184 FLOWCYTOMETRY/ TC 1 MARKER: CPT

## 2022-09-22 PROCEDURE — 85060 BLOOD SMEAR INTERPRETATION: CPT

## 2022-09-22 PROCEDURE — 83615 LACTATE (LD) (LDH) ENZYME: CPT

## 2022-09-22 PROCEDURE — 36415 COLL VENOUS BLD VENIPUNCTURE: CPT

## 2022-09-22 PROCEDURE — 80053 COMPREHEN METABOLIC PANEL: CPT

## 2022-09-22 PROCEDURE — 85007 BL SMEAR W/DIFF WBC COUNT: CPT

## 2022-09-23 LAB — HEMATOLOGIST REVIEW: NORMAL

## 2022-09-26 ENCOUNTER — SPECIALTY PHARMACY (OUTPATIENT)
Dept: PHARMACY | Facility: CLINIC | Age: 62
End: 2022-09-26
Payer: MEDICARE

## 2022-09-26 ENCOUNTER — OFFICE VISIT (OUTPATIENT)
Dept: HEMATOLOGY/ONCOLOGY | Facility: CLINIC | Age: 62
End: 2022-09-26
Payer: MEDICARE

## 2022-09-26 VITALS
SYSTOLIC BLOOD PRESSURE: 97 MMHG | HEART RATE: 83 BPM | HEIGHT: 71 IN | TEMPERATURE: 98 F | BODY MASS INDEX: 21.14 KG/M2 | RESPIRATION RATE: 18 BRPM | WEIGHT: 151 LBS | DIASTOLIC BLOOD PRESSURE: 68 MMHG | OXYGEN SATURATION: 97 %

## 2022-09-26 DIAGNOSIS — C92.10 CML (CHRONIC MYELOCYTIC LEUKEMIA): Primary | ICD-10-CM

## 2022-09-26 PROCEDURE — 99999 PR PBB SHADOW E&M-EST. PATIENT-LVL V: ICD-10-PCS | Mod: PBBFAC,,, | Performed by: INTERNAL MEDICINE

## 2022-09-26 PROCEDURE — 99999 PR PBB SHADOW E&M-EST. PATIENT-LVL V: CPT | Mod: PBBFAC,,, | Performed by: INTERNAL MEDICINE

## 2022-09-26 PROCEDURE — 99214 OFFICE O/P EST MOD 30 MIN: CPT | Mod: S$PBB,,, | Performed by: INTERNAL MEDICINE

## 2022-09-26 PROCEDURE — 99215 OFFICE O/P EST HI 40 MIN: CPT | Mod: PBBFAC | Performed by: INTERNAL MEDICINE

## 2022-09-26 PROCEDURE — 99214 PR OFFICE/OUTPT VISIT, EST, LEVL IV, 30-39 MIN: ICD-10-PCS | Mod: S$PBB,,, | Performed by: INTERNAL MEDICINE

## 2022-09-26 RX ORDER — ATORVASTATIN CALCIUM 10 MG/1
10 TABLET, FILM COATED ORAL DAILY
COMMUNITY
Start: 2022-07-05 | End: 2022-09-27

## 2022-09-26 RX ORDER — LEVETIRACETAM 500 MG/1
1500 TABLET ORAL 2 TIMES DAILY
COMMUNITY
Start: 2022-07-05

## 2022-09-26 NOTE — TELEPHONE ENCOUNTER
Therapy appropriate. Asciminib for Ph+ CML after more than 2 failed therapies.     Submitted PA through [email or web address] portal

## 2022-09-27 ENCOUNTER — SPECIALTY PHARMACY (OUTPATIENT)
Dept: PHARMACY | Facility: CLINIC | Age: 62
End: 2022-09-27
Payer: MEDICARE

## 2022-09-27 LAB
% BLASTS: 4
% NEUTROPHILS (OHS): 76
LYMPHOCYTES NFR SPEC AUTO: 1 %
MONOCYTES %: <1
PATH REV: NORMAL

## 2022-09-27 NOTE — TELEPHONE ENCOUNTER
Asciminib PA approved. PA Case: [id number]. Coverage Starts on: 9/26/2022, Coverage Ends on: 3/25/2023     Estimated copay: $0. Routing to initial consult.

## 2022-09-27 NOTE — TELEPHONE ENCOUNTER
Specialty Pharmacy - Initial Clinical Assessment    Specialty Medication Orders Linked to Encounter      Flowsheet Row Most Recent Value   Medication #1 asciminib 40 mg Tab (Order#[id number], Rx#[id number])          Patient Diagnosis   C92.10 - CML (chronic myelocytic leukemia)    Subjective    Poli Armendariz Sr. is a 61 y.o. male, who is followed by the specialty pharmacy service for management and education.    Recent Encounters       Date Type Provider Description    09/27/2022 Specialty Pharmacy Yana Pedro, Sarah Initial Clinical Assessment    09/26/2022 Specialty Pharmacy DAYAMI TORRES, Sarah Referral Authorization    07/22/2022 Specialty Pharmacy Katie Velazquez-Alfonzo Refill Coordination    07/01/2022 Specialty Pharmacy Tory Moreno, Sarah Refill Coordination    06/06/2022 Specialty Pharmacy Nisreen Meza, Sarah Initial Clinical Assessment          Clinical call attempts since last clinical assessment   No call attempts found.     Current Outpatient Medications   Medication Sig    asciminib 40 mg Tab Take 40 mg by mouth 2 (two) times a day.    aspirin 81 MG Chew Take 81 mg by mouth once daily.    azelastine (ASTELIN) 137 mcg (0.1 %) nasal spray 2 sprays by Nasal route 2 (two) times a day.    clopidogreL (PLAVIX) 75 mg tablet Take 75 mg by mouth once daily.    gabapentin (NEURONTIN) 100 MG capsule Take 100 mg by mouth 3 (three) times daily.    HYDROcodone-acetaminophen (NORCO)  mg per tablet TAKE ONE TABLET BY MOUTH EVERY FOUR HOURS AS NEEDED FOR PAIN    isosorbide mononitrate (IMDUR) 30 MG 24 hr tablet Take 15 mg by mouth once daily.    levETIRAcetam (KEPPRA) 500 MG Tab Take 1,500 mg by mouth 2 (two) times daily.    metoprolol succinate 25 mg CSpX Take 25 mg by mouth once daily.    naproxen sodium 220 mg Cap Take 1 tablet by mouth every 6 (six) hours as needed.    ondansetron (ZOFRAN) 4 MG tablet Take 1 tablet (4 mg total) by mouth every 6 (six) hours as needed.    pravastatin (PRAVACHOL) 20  MG tablet Take 20 mg by mouth every evening.    sertraline (ZOLOFT) 25 MG tablet Take 25 mg by mouth once daily.   Last reviewed on 9/27/2022  3:27 PM by Yana Pedro PharmD    Review of patient's allergies indicates:   Allergen Reactions    Dasatinib      Other reaction(s): Unknown   Last reviewed on  9/27/2022 3:17 PM by Yana Pedro    Drug Interactions    Drug interactions evaluated: yes  Clinically relevant drug interactions identified: no  Provided the patient with educational material regarding drug interactions: not applicable         Adverse Effects    Arthralgias: Pos  Myalgias: Pos  Headaches: Pos  Constitution: System reviewed and is negative  Skin: System reviewed and is negative  Eyes: System reviewed and is negative  Endocrine and Metabolic: System reviewed and is negative  Gastrointestinal: System reviewed and is negative  Genitourinary: System reviewed and is negative  Cardiovascular: System reviewed and is negative  Hematologic: System reviewed and is negative  HENT: System reviewed and is negative  Psychiatric: System reviewed and is negative  Respiratory: System reviewed and is negative       Assessment Questions - Documented Responses      Flowsheet Row Most Recent Value   Assessment    Medication Reconciliation completed for patient Yes   During the past 4 weeks, has patient missed any activities due to condition or medication? No   During the past 4 weeks, did patient have any of the following urgent care visits? None   Goals of Therapy Status Discussed (new start)   Status of the patients ability to self-administer: Is Able   All education points have been covered with patient? Yes, supplemental printed education provided   Welcome packet contents reviewed and discussed with patient? Yes   Assesment completed? Yes   Plan Therapy being initiated   Do you need to open a clinical intervention (i-vent)? No   Do you want to schedule first shipment? Yes   Medication #1 Assessment Info   "  Patient status New medication, New to [facility]   Is this medication appropriate for the patient? Yes   Is this medication effective? Not yet started          Refill Questions - Documented Responses      Flowsheet Row Most Recent Value   Patient Availability and HIPAA Verification    Does patient want to proceed with activity? Yes   HIPAA/medical authority confirmed? Yes   Relationship to patient of person spoken to? Child   Refill Screening Questions    When does the patient need to receive the medication? 10/10/22   Refill Delivery Questions    How will the patient receive the medication? Mail   When does the patient need to receive the medication? 10/10/22   Shipping Address Home   Address in [facility] confirmed and updated if neccessary? Yes   Expected Copay ($) 0   Is the patient able to afford the medication copay? Yes   Payment Method zero copay   Days supply of Refill 30   Supplies needed? No supplies needed   Refill activity completed? Yes   Refill activity plan Refill scheduled   Shipment/Pickup Date: 09/28/22            Objective    He has a past medical history of Amputation of toe, Aphasia, CML (chronic myelocytic leukemia), CVA (cerebral vascular accident), History of craniotomy, Hypothyroidism, unspecified, Myelocytic leukemia, PAD (peripheral artery disease), Subdural hematoma, and Tobacco use.    Tried/failed medications: Gleevec, Sprycel, Bosulif, Hydroxyurea    BP Readings from Last 4 Encounters:   09/26/22 97/68   09/08/22 (!) 110/59   08/24/22 (!) 115/58   06/24/22 97/67     Ht Readings from Last 4 Encounters:   09/26/22 5' 10.98" (1.803 m)   09/08/22 5' 11" (1.803 m)   08/24/22 5' 11" (1.803 m)   06/24/22 5' 11.97" (1.828 m)     Wt Readings from Last 4 Encounters:   09/26/22 68.5 kg (151 lb)   09/08/22 64.8 kg (142 lb 13.7 oz)   08/24/22 63.7 kg (140 lb 6.9 oz)   06/24/22 62.6 kg (138 lb)     Recent Labs   Lab Result Units 09/22/22  1540 09/08/22  0922 08/22/22  1154 08/19/22  1130   RBC " x10(6)/mcL 3.92 L 4.50 L 4.56 L 4.38 L   Hgb gm/dL 9.4 L 11.6 L 11.5 L 10.6 L   Hct % 29.7 L 33.6 L 34.1 L 33.4 L   WBC x10(3)/mcL 400.8 .7 .1 .9 HH   Platelet x10(3)/mcL 134 193 196 185   Sodium Level mmol/L 133 L 134 L 132 L 134 L   Potassium Level mmol/L 3.8 4.8 4.9 4.5   Blood Urea Nitrogen mg/dL 23.0 21.0 18.0 19.0   Creatinine mg/dL 1.60 H 1.66 H 1.21 H 1.44 H   Calcium Level Total mg/dL 9.2 10.3 H 9.7 9.6   Albumin Level gm/dL 4.1 4.5 4.2 4.0   Bilirubin Total mg/dL 0.3 0.3 0.4 0.3   Alkaline Phosphatase unit/L 128 162 H 139 128   Aspartate Aminotransferase unit/L 34 40 H 39 H 32   Alanine Aminotransferase unit/L 18 21 16 14     The goals of cancer treatment include:  Achieving remission of cancer, if possible  Reducing tumor size and spread of cancer, if remission is not possible  Minimizing pain and symptoms of the cancer  Preventing infection and other complications of treatment  Promoting adequate nutrition  Encouraging proper hydration  Improving or maintaining quality of life  Maintaining optimal therapy adherence  Minimizing and managing side effects    Goals of Therapy Status: Discussed (new start)    Assessment/Plan  Patient plans to start therapy on 10/10/22      Indication, dosage, appropriateness, effectiveness, safety and convenience of his specialty medication(s) were reviewed today.     Patient Education   Patient received education on the following:   Expectations and possible outcomes of therapy  Proper use, timely administration, and missed dose management  Duration of therapy  Side effects, including prevention, minimization, and management  Contraindications and safety precautions  New or changed medications, including prescribe and over the counter medications and supplements  Reviews recommended vaccinations, as appropriate  Storage, safe handling, and disposal    Patient told by provider to hold medication until he is seen back in clinic in 2 weeks    Tasks added this  encounter   11/2/2022 - Refill Call (Auto Added)  3/19/2023 - Clinical - Follow Up Assesement (180 day)   Tasks due within next 3 months   No tasks due.     Yana Pedro, PharmD  [facility]  [address]  [facility] [address]  Phone: [phone]  Fax: [phone]

## 2022-10-06 ENCOUNTER — TELEPHONE (OUTPATIENT)
Dept: HEMATOLOGY/ONCOLOGY | Facility: CLINIC | Age: 62
End: 2022-10-06
Payer: MEDICARE

## 2022-10-06 NOTE — TELEPHONE ENCOUNTER
----- Message from Enma Velasquez MA sent at 10/5/2022  3:05 PM CDT -----  Regarding: RE: New oral chemotherapy  Patient is still refusing oral chemo at this time. Patient is refusing any chemo ed until after his visit on 10/17/22 with Dr. Bah. Patient states that at that time if he will decide if wants to go through with oral chemo and will schedule oral chemo ed at that time.  ----- Message -----  From: Rosalia Aguero MA  Sent: 10/5/2022   8:25 AM CDT  To: Enma Velasquez MA  Subject: FW: New oral chemotherapy                        Please schedule chemo education.  ----- Message -----  From: Terra Velazquez RN  Sent: 9/30/2022  10:10 AM CDT  To: Rosalia Ageuro MA, Jeanie Kerr, #  Subject: New oral chemotherapy                            Pt need chemo education schedule.Pt needs to sign a chemotherapy consent. His next visit is 10/17/222. Dr. Bah instructed patient not to take medication until next visit.

## 2022-10-17 ENCOUNTER — LAB VISIT (OUTPATIENT)
Dept: LAB | Facility: HOSPITAL | Age: 62
End: 2022-10-17
Attending: INTERNAL MEDICINE
Payer: MEDICARE

## 2022-10-17 ENCOUNTER — TELEPHONE (OUTPATIENT)
Dept: HEMATOLOGY/ONCOLOGY | Facility: CLINIC | Age: 62
End: 2022-10-17
Payer: MEDICARE

## 2022-10-17 DIAGNOSIS — C92.10 CML (CHRONIC MYELOCYTIC LEUKEMIA): ICD-10-CM

## 2022-10-17 LAB
ABS NEUT CALC (OHS): 391.73 X10(3)/MCL (ref 2.1–9.2)
ALBUMIN SERPL-MCNC: 4 GM/DL (ref 3.5–5)
ALBUMIN/GLOB SERPL: 1.1 RATIO (ref 1.1–2)
ALP SERPL-CCNC: 149 UNIT/L (ref 40–150)
ALT SERPL-CCNC: 17 UNIT/L (ref 0–55)
ANISOCYTOSIS BLD QL SMEAR: SLIGHT
AST SERPL-CCNC: 41 UNIT/L (ref 5–34)
B-HCG SERPL QL: 10 %
BILIRUBIN DIRECT+TOT PNL SERPL-MCNC: 0.4 MG/DL
BUN SERPL-MCNC: 17 MG/DL (ref 8.4–25.7)
CALCIUM SERPL-MCNC: 9.3 MG/DL (ref 8.4–10.2)
CHLORIDE SERPL-SCNC: 102 MMOL/L (ref 98–107)
CO2 SERPL-SCNC: 20 MMOL/L (ref 22–29)
CREAT SERPL-MCNC: 1.59 MG/DL (ref 0.73–1.18)
EOSINOPHIL NFR BLD MANUAL: 2 % (ref 0–8)
EOSINOPHIL NFR BLD MANUAL: 9.11 X10(3)/MCL (ref 0–0.9)
ERYTHROCYTE [DISTWIDTH] IN BLOOD BY AUTOMATED COUNT: 20.8 % (ref 11.5–17)
GFR SERPLBLD CREATININE-BSD FMLA CKD-EPI: 50 MLS/MIN/1.73/M2
GLOBULIN SER-MCNC: 3.6 GM/DL (ref 2.4–3.5)
GLUCOSE SERPL-MCNC: 52 MG/DL (ref 74–100)
HCT VFR BLD AUTO: 29.8 % (ref 42–52)
HGB BLD-MCNC: 9.2 GM/DL (ref 14–18)
HYPOCHROMIA BLD QL SMEAR: SLIGHT
IMM GRANULOCYTES # BLD AUTO: 167.76 X10(3)/MCL (ref 0–0.04)
IMM GRANULOCYTES NFR BLD AUTO: 36.8 %
LYMPHOCYTES NFR BLD MANUAL: 27.33 X10(3)/MCL
LYMPHOCYTES NFR BLD MANUAL: 6 % (ref 13–40)
MCH RBC QN AUTO: 23.1 PG (ref 27–31)
MCHC RBC AUTO-ENTMCNC: 30.9 MG/DL (ref 33–36)
MCV RBC AUTO: 74.7 FL (ref 80–94)
METAMYELOCYTES NFR BLD MANUAL: 5 %
MICROCYTES BLD QL SMEAR: SLIGHT
MONOCYTES NFR BLD MANUAL: 13.66 X10(3)/MCL (ref 0.1–1.3)
MONOCYTES NFR BLD MANUAL: 3 % (ref 2–11)
MYELOCYTES NFR BLD MANUAL: 4 %
NEUTROPHILS NFR BLD MANUAL: 53 % (ref 47–80)
NEUTS BAND NFR BLD MANUAL: 14 % (ref 0–11)
PLATELET # BLD AUTO: 194 X10(3)/MCL (ref 130–400)
PLATELET # BLD EST: ADEQUATE 10*3/UL
PMV BLD AUTO: 10.1 FL (ref 7.4–10.4)
POIKILOCYTOSIS BLD QL SMEAR: SLIGHT
POTASSIUM SERPL-SCNC: 4.8 MMOL/L (ref 3.5–5.1)
PROLYMPHOCYTES # BLD MANUAL: 3 %
PROT SERPL-MCNC: 7.6 GM/DL (ref 6.4–8.3)
RBC # BLD AUTO: 3.99 X10(6)/MCL (ref 4.7–6.1)
RBC MORPH BLD: ABNORMAL
SODIUM SERPL-SCNC: 135 MMOL/L (ref 136–145)
WBC # SPEC AUTO: 455.5 X10(3)/MCL (ref 4.5–11.5)

## 2022-10-17 PROCEDURE — 80053 COMPREHEN METABOLIC PANEL: CPT

## 2022-10-17 PROCEDURE — 85025 COMPLETE CBC W/AUTO DIFF WBC: CPT

## 2022-10-17 PROCEDURE — 85027 COMPLETE CBC AUTOMATED: CPT

## 2022-10-17 PROCEDURE — 36415 COLL VENOUS BLD VENIPUNCTURE: CPT

## 2022-10-17 NOTE — TELEPHONE ENCOUNTER
Notified of abnormal labs. Called the patient about the abnormal labs, gave him ER precaution and will schedule him for an appointment as soon as possible.       Stevne Bah MD  Hematology / Oncology

## 2022-10-19 NOTE — PROGRESS NOTES
PATIENT: Poli Armendariz .  MRN: [id number]  DATE: 10/20/2022    PCP: Sergey Andino MD  Cardiology: [facility]     Diagnosis:  -  CML 5/2009    - 8/24/22 mutation analysis: negative     Treatment History:  - Imatinib   - Dasatinib 2018 - 12/2021 (pl effusion)   - Bosutinib - 1/2022- 6/2022 (inc WBC)      Current Treatment:   - HU since 6/2022 but not taking it   - s/w ASCIMINIB but not taking it     Chief Complaint: CML (No new concerns noted.)      Oncologic History:      Oncologic History CML:  5/29/2009 BMB   Hypercellular myeloid predominant marrow with left shifted myeloid maturation, and mild eosinophilia and basophilia, consistent with chronic myelogenous leukemia.   Flow cytometry-findings suggestive of left shifted myeloid maturation no evidence of acute leukemia, non-Hodgkin's lymphoma or plasma cell dyscrasia.   FISH for CML: BCR-ABL: BCR-ABL translocation was observed and 84.5% of cells analyzed using the BCR-ABL dual fusion probes, consistent with involvement by CML. Translocation of the ABL gene on chromosome 9 with the see BCR gene on chromosome 22 is consistent with diagnosis of chronic myelogenous leukemia   PCR for Matt to: No JAK2 mutation was detected by PCR.      Oncologic Treatment Gleevec since initial Dx. BCR-ABL 5% May or June 2018, not able to tolerate it, and Changed to Sprycel  Sprycel June 2018 - unclear when stopped  2019 admitted for multiple neurological issues prolong ICU stay, subdural hematoma.  Sprycel restarted 4/23/2020  Sprycel dose reduced 8/2021 due to pleural effusion.   sprycel 12/26/2021 stopped due to pleural effusion  CXR 1/21/2022 pleural effusion resolved--> plans for different medication; bosulif. Script sent 1/27/22 for Bosulif - Stopped on 6/1/22 for increased WBC  ECHO with EF 60%  Started Hydrea 500 mg BID 6/1/22---->8/19/22  Increased Hydrea (500 mg tab) taking 2 tabs TID (total 3 gm daily) started 8/19/22--->8/31/22  ECHO with EF 50%      Pathology           Subjective:    Interval History: Mr. Armendariz is a 61 y.o. male who returns for scheduled follow up visit with his son Kulwinder for f/u of CML.     He was on Bosulif but stop it due to toxicities and worsening disease. He was then started on HU twice times a day for white blood cell counts over 200,000. He was noted to have increase in counts and was called by Dr. Castro's nurse on 8/19/22 to increase Hydrea to 2 tabs TID related to increase in .9. Pt has not been taking medications for more than 2 months as it made him feels nauseous.     Patient has difficulty speaking and express himself with hand signals. Discussed with him about his counts and compliance. Also explained to him about starting him on asciminib but pt is adamant that he does not want to take any medications anymore. Informed him of his disease progression and can lead to death if he is not compliant. According to the son, whole family will try to convince him again to start the new medication. He want me to sent the prescription and he will then work with the family to help him start the father on the medication.     10/20/2022 - Patient is here again today for the next appointment and is still adamant that he do not want to take the medication.  Explained to him about the risk and progressively worsening disease with elevated white count on this visit.  Also explained to him to check for further workup and was started on allopurinol for his elevated uric acid.  Per the patient and his son, that we need to check the uric acid level and hepatitis panel before starting him on this medication but patient refused to have labs done.  Told the son to bring his father for lab works before starting the medication.  I will schedule him in 1 month but told of son that if he decides not to be treated then he can cancel appointment.  In case he decides to be treated earlier he should reschedule his appointment earlier so we can check his labs and then start treatment.              Past Medical History:   Past Medical History:   Diagnosis Date    Amputation of toe     Aphasia     CML (chronic myelocytic leukemia)     CVA (cerebral vascular accident)     History of craniotomy     Hypothyroidism, unspecified     Myelocytic leukemia     PAD (peripheral artery disease)     Subdural hematoma     Tobacco use        Past Surgical HIstory:   Past Surgical History:   Procedure Laterality Date    B/L PA with Stent Placement  08/26/2020    BONE MARROW BIOPSY  05/29/2009    C-Spine Surgery      FOOT SURGERY      INSERTION OF PERCUTANEOUS ENDOSCOPIC GASTROSTOMY (PEG) FEEDING TUBE  08/20/2019    and 07/25/2019    Left Craniotomy Hematoma Evacuation   07/06/2019    Right Below the Knee Amputation  05/29/2009    Savory Dilation  08/20/2019    TOE AMPUTATION  12/14/2020       Family History:   Family History   Problem Relation Age of Onset    Colon cancer Mother     Leukemia Paternal Grandfather     Colon cancer Maternal Aunt        Social History:  reports that he has never smoked. He has never used smokeless tobacco. He reports current alcohol use of about 7.0 standard drinks per week. He reports that he does not use drugs.    Allergies:  Review of patient's allergies indicates:   Allergen Reactions    Dasatinib      Other reaction(s): Unknown       Medications:  Current Outpatient Medications   Medication Sig Dispense Refill    aspirin 81 MG Chew Take 81 mg by mouth once daily.      azelastine (ASTELIN) 137 mcg (0.1 %) nasal spray 2 sprays by Nasal route 2 (two) times a day.      clopidogreL (PLAVIX) 75 mg tablet Take 75 mg by mouth once daily.      furosemide (LASIX) 20 MG tablet Take 1 tablet by mouth once daily.      gabapentin (NEURONTIN) 100 MG capsule Take 100 mg by mouth 3 (three) times daily.      HYDROcodone-acetaminophen (NORCO)  mg per tablet TAKE ONE TABLET BY MOUTH EVERY FOUR HOURS AS NEEDED FOR PAIN 75 tablet 0    HYDROcodone-acetaminophen (NORCO)  mg per tablet Take 1  "tablet by mouth as needed.      isosorbide mononitrate (IMDUR) 30 MG 24 hr tablet Take 15 mg by mouth once daily.      levETIRAcetam (KEPPRA) 500 MG Tab Take 1,500 mg by mouth 2 (two) times daily.      metoprolol succinate 25 mg CSpX Take 25 mg by mouth once daily.      naproxen sodium 220 mg Cap Take 1 tablet by mouth every 6 (six) hours as needed.      ondansetron (ZOFRAN) 4 MG tablet Take 1 tablet (4 mg total) by mouth every 6 (six) hours as needed. 90 tablet 2    pravastatin (PRAVACHOL) 20 MG tablet Take 20 mg by mouth every evening.      sertraline (ZOLOFT) 25 MG tablet Take 25 mg by mouth once daily.      allopurinoL (ZYLOPRIM) 300 MG tablet Take 1 tablet (300 mg total) by mouth once daily. 30 tablet 1    asciminib 40 mg Tab Take 40 mg by mouth 2 (two) times a day. (Patient not taking: Reported on 10/20/2022) 60 tablet 5     No current facility-administered medications for this visit.       Review of Systems   Constitutional: Positive for activity change, appetite change and fatigue.   HENT: Negative.    Eyes: Negative.    Respiratory: Negative.    Cardiovascular: Negative.    Gastrointestinal: Negative.    Endocrine: Negative.    Genitourinary: Negative.    Musculoskeletal: Positive for arthralgias, back pain and joint swelling.   Skin: Negative.    Allergic/Immunologic: Negative.    Neurological: Positive for facial asymmetry, speech difficulty and weakness.   Hematological: Negative.    Psychiatric/Behavioral: Negative.    All other systems reviewed and are negative.      ECOG Performance Status:   ECOG SCORE             Objective:      Vitals:   Vitals:    10/20/22 1130   BP: 93/61   BP Location: Right arm   Patient Position: Sitting   Pulse: 79   Resp: 18   Temp: 98.4 °F (36.9 °C)   SpO2: 98%   Weight: 72.5 kg (159 lb 12.8 oz)   Height: 5' 10" (1.778 m)         BMI: Body mass index is 22.93 kg/m².    Physical Exam  Vitals and nursing note reviewed.   Constitutional:       General: He is awake.      "   HENT:      Head: Normocephalic and atraumatic.      Nose: Nose normal.      Mouth/Throat:      Mouth: Mucous membranes are moist.   Eyes:      Conjunctiva/sclera: Conjunctivae normal.   Cardiovascular:      Rate and Rhythm: Normal rate and regular rhythm.      Heart sounds: Normal heart sounds.   Pulmonary:      Effort: Pulmonary effort is normal.      Breath sounds: Normal breath sounds.   Abdominal:      Palpations: Abdomen is soft.      Tenderness: There is no abdominal tenderness.   Musculoskeletal:         General: No tenderness.      Cervical back: Neck supple.      Comments: S/p right BKA  Left 3rd toe amputataion      Right Lower Extremity: Right leg is amputated above knee.   Skin:     Findings: No erythema or rash.   Neurological:      Mental Status: He is alert and oriented to person, place, and time. Mental status is at baseline.      Cranial Nerves: Cranial nerve deficit, dysarthria and facial asymmetry present.      Sensory: Sensory deficit: Left side weakness.      Motor: Weakness present.         Laboratory Data:  Lab Visit on 10/17/2022   Component Date Value Ref Range Status    Sodium Level 10/17/2022 135 (L)  136 - 145 mmol/L Final    Potassium Level 10/17/2022 4.8  3.5 - 5.1 mmol/L Final    Chloride 10/17/2022 102  98 - 107 mmol/L Final    Carbon Dioxide 10/17/2022 20 (L)  22 - 29 mmol/L Final    Glucose Level 10/17/2022 52 (L)  74 - 100 mg/dL Final    Blood Urea Nitrogen 10/17/2022 17.0  8.4 - 25.7 mg/dL Final    Creatinine 10/17/2022 1.59 (H)  0.73 - 1.18 mg/dL Final    Calcium Level Total 10/17/2022 9.3  8.4 - 10.2 mg/dL Final    Protein Total 10/17/2022 7.6  6.4 - 8.3 gm/dL Final    Albumin Level 10/17/2022 4.0  3.5 - 5.0 gm/dL Final    Globulin 10/17/2022 3.6 (H)  2.4 - 3.5 gm/dL Final    Albumin/Globulin Ratio 10/17/2022 1.1  1.1 - 2.0 ratio Final    Bilirubin Total 10/17/2022 0.4  <=1.5 mg/dL Final    Alkaline Phosphatase 10/17/2022 149  40 - 150 unit/L Final    Alanine Aminotransferase  10/17/2022 17  0 - 55 unit/L Final    Aspartate Aminotransferase 10/17/2022 41 (H)  5 - 34 unit/L Final    eGFR 10/17/2022 50  mls/min/1.73/m2 Final    WBC 10/17/2022 455.5 (HH)  4.5 - 11.5 x10(3)/mcL Final    RBC 10/17/2022 3.99 (L)  4.70 - 6.10 x10(6)/mcL Final    Hgb 10/17/2022 9.2 (L)  14.0 - 18.0 gm/dL Final    Hct 10/17/2022 29.8 (L)  42.0 - 52.0 % Final    MCV 10/17/2022 74.7 (L)  80.0 - 94.0 fL Final    MCH 10/17/2022 23.1 (L)  27.0 - 31.0 pg Final    MCHC 10/17/2022 30.9 (L)  33.0 - 36.0 mg/dL Final    RDW 10/17/2022 20.8 (H)  11.5 - 17.0 % Final    Platelet 10/17/2022 194  130 - 400 x10(3)/mcL Final    MPV 10/17/2022 10.1  7.4 - 10.4 fL Final    IG# 10/17/2022 167.76 (H)  0 - 0.04 x10(3)/mcL Final    IG% 10/17/2022 36.8  % Final    Neut Man 10/17/2022 53  47 - 80 % Final    Band Neutrophil Man 10/17/2022 14 (H)  0 - 11 % Final    Lymph Man 10/17/2022 6 (L)  13 - 40 % Final    Monocyte Man 10/17/2022 3  2 - 11 % Final    Eos Man 10/17/2022 2  0 - 8 % Final    [place] Man 10/17/2022 5  % Final    Myelo Man 10/17/2022 4  % Final    Blasts Man 10/17/2022 10  % Final    Prolymph Man 10/17/2022 3  % Final    Abs Neut calc 10/17/2022 391.73 (H)  2.1 - 9.2 x10(3)/mcL Final    Abs Mono 10/17/2022 13.665 (H)  0.1 - 1.3 x10(3)/mcL Final    Abs Lymp 10/17/2022 27.33 (H)  0.6 - 4.6 x10(3)/mcL Final    Abs Eos  10/17/2022 9.11 (H)  0 - 0.9 x10(3)/mcL Final    RBC Morph 10/17/2022 Abnormal (A)  Normal Final    Anisocyte 10/17/2022 Slight (A)  (none) Final    Poik 10/17/2022 Slight (A)  (none) Final    Microcyte 10/17/2022 Slight (A)  (none) Final    Hypochrom 10/17/2022 Slight (A)  (none) Final    Platelet Est 10/17/2022 Adequate  Normal, Adequate Final     Peripheral smear on 5/25/22: Borderline microcytic anemia, Adequate platelet population, Marked leukocytosis secondary to absolute neutrophilia, monocytosis, and basophilia with left-shifted granulocytic maturation. Rare (<1%) immature cells, consistent with blast  identified.   Flow cytometry: There is phenotypic evidence of a granulocytic shift.   There peripheral smear and flow cytometry findings are worrisome for a myeloproliferative neoplasm,     Peripheral Smear on 8/22/22: Consistent with the patient's known history of chronic myelogenous leukemia, chronic phase.  No evidence of accelerated phase or blast transformation.  Red blood cells are normochromic and normocytic.  There are occasional nucleated red cells. Platelets are normal in number and morphology    Collected: 06/22/22 0850   Result status: Final   Resulting lab: [facility]   Value: SEE COMMENTS   Comment: Peripheral blood, BCR/ABL1 mRNA analysis qualitative:       Positive for BCR/ABL1 mRNA. The detected transcript   e13/e14-a2, which produces a p210 kD protein. The   quantitative level of BCR/ABL1 e13/e14-a2 transcripts were   detected above the limit of quantitation, >55% IS total   ABL1 (%BCR/ABL1(p210):ABL1) (<MR 0.26).  NOTE: Please use   test ID: BCRAB for future testing to obtain quantitative   p210 BCR-ABL1 mRNA levels in this patient.  See COMMENT.       % BCRABL1 (p210):ABL1 in this assay is reported using the   International Scale (IS) (Ref:Baccarani M, et al.   Blood:[id number]; Digna RD, et al. J Mol Diagn   2013;15:565-576).       COMMENT: This patient has been previously followed by the   dedicated quantitative p210 BCR-ABL1 mRNA assay (BCRAB).     Consider additional testing for kinase domain mutations, if   clinically warranted (BAKDM).              Labs:   8/22/22: .1  8/19/22: .9  5/26/22: .7. Peripheral blood smear showed 4% blasts   4/26/22: WBC 87.06, Neutrophils 47.32, BCR- ABL positive  3/14/22: WBC 26.82, Neutrophils 11.3, Smear shows 2% blasts  1/14/22: WBC 5.26, Neutrophils 2.66, BCR-ABL positive  12/20/21: WBC 5.62, Neutrophils 1.81  11/18/21: WBC 6.7, BCR-ABL positive     - Occult Stool positive on 9/1/20    Assessment:     1. CML (chronic myelocytic  leukemia) C92.10  - Gleevec discontinued due to rash many years ago.   - Recurrent Pleural effusions on sprycel in 12/2021 and Sprycel discontinued, effusions resolved. EF on ECHO 60%   - BOSULIF 500mg po daily was started in 01/2022. However, WBC rapidly increased with increased blasts % during Bosulif treatment from January 2022 to May 2022; therefore stopped Bosulif and Hydrea was started initially 500 mg BID daily.  - WBC 282K- keeps trending up with NO acute leukemia blasts per PBS. Positive BCR-ABL PCR analysis. Patient has never been complaint with Hydrea   - Peripheral smear 8/22/22: Consistent with chronic myelogenous leukemia, chronic phase.  No evidence of accelerated phase or blast transformation.    - 9/8/22: Patient had not been taking his Hydrea since last 2 months. Patient is expressing that he does not want to continue with Hydrea therapy.   - 09/26/2022: Pt not taking HU and now even refused to get any treatment at all despite explained the risks with progression of the disease, which can lead to death. Son requested to sent the prescription and then with whole family, he would convince the father to start taking the new medication.   - BCR/ABL mutation sequencing 8/24/22: Negative   - Prescribed Asciminib 40 mg bid as failed / intolerant to three TKIs. Asked pt not to take it before see us on the follow up visit   - Patient refused to take the medication.  Explained to him about the risk and progressively worsening disease with elevated white count on this visit.  Refused lab work. Told the son to bring his father for lab works before starting the medication.      Monitor with asciminib:   - Bone marrow suppression:   - Cardiovascular toxicityincluding ischemic cardiac and CNS conditions, arterial thrombotic and embolic conditions, and heart failure, arrhythmia (including QTc prolongation)   - Pancreatitis   - Hypertension  - Check CBC every 2 weeks for the first 3 months and monthly thereafter    - Check serum lipase and amylase levels monthly    2. Anemia   - stable H/H      Plan:    - Prescribed Asciminib 40 mg bid. Pt refused to take it. Asked pt not to take it before see us on the follow up visit and labs done.  - s/w allopurinol 300 mg daily and will check the uric acid on the follow up   - Once started on asciminib, check CBC every 2 weeks for the first 3 months and monthly thereafter & check serum lipase and amylase levels monthly        RTC in 4 weeks with MD with labs for reassessment, if decides to get treatment        Problem List Items Addressed This Visit          1 - High    CML (chronic myelocytic leukemia)    Overview     Diagnosis:  -  CML 5/2009    - 8/24/22 mutation analysis: negative     Treatment History:  - Imatinib   - Dasatinib 2018 - 12/2021 (pl effusion)   - Bosutinib - 1/22- 6/22 (inc WBC)  - HU since 6/2022 but not taking it     Current Treatment:   - s/w ASCIMINIB          Relevant Medications    allopurinoL (ZYLOPRIM) 300 MG tablet    Other Relevant Orders    Uric Acid    Magnesium    Comprehensive Metabolic Panel    CBC Auto Differential    Hepatitis B Surface Antigen    Hepatitis B Core Antibody, Total     Other Visit Diagnoses       Hyperuricemia    -  Primary    Relevant Medications    allopurinoL (ZYLOPRIM) 300 MG tablet

## 2022-10-20 ENCOUNTER — OFFICE VISIT (OUTPATIENT)
Dept: HEMATOLOGY/ONCOLOGY | Facility: CLINIC | Age: 62
End: 2022-10-20
Payer: MEDICARE

## 2022-10-20 VITALS
OXYGEN SATURATION: 98 % | HEIGHT: 70 IN | SYSTOLIC BLOOD PRESSURE: 93 MMHG | BODY MASS INDEX: 22.88 KG/M2 | HEART RATE: 79 BPM | DIASTOLIC BLOOD PRESSURE: 61 MMHG | RESPIRATION RATE: 18 BRPM | WEIGHT: 159.81 LBS | TEMPERATURE: 98 F

## 2022-10-20 DIAGNOSIS — E79.0 HYPERURICEMIA: Primary | ICD-10-CM

## 2022-10-20 DIAGNOSIS — C92.10 CML (CHRONIC MYELOCYTIC LEUKEMIA): ICD-10-CM

## 2022-10-20 PROCEDURE — 99999 PR PBB SHADOW E&M-EST. PATIENT-LVL IV: CPT | Mod: PBBFAC,,, | Performed by: INTERNAL MEDICINE

## 2022-10-20 PROCEDURE — 99213 PR OFFICE/OUTPT VISIT, EST, LEVL III, 20-29 MIN: ICD-10-PCS | Mod: S$PBB,,, | Performed by: INTERNAL MEDICINE

## 2022-10-20 PROCEDURE — 99999 PR PBB SHADOW E&M-EST. PATIENT-LVL IV: ICD-10-PCS | Mod: PBBFAC,,, | Performed by: INTERNAL MEDICINE

## 2022-10-20 PROCEDURE — 99213 OFFICE O/P EST LOW 20 MIN: CPT | Mod: S$PBB,,, | Performed by: INTERNAL MEDICINE

## 2022-10-20 PROCEDURE — 99214 OFFICE O/P EST MOD 30 MIN: CPT | Mod: PBBFAC | Performed by: INTERNAL MEDICINE

## 2022-10-20 RX ORDER — ALLOPURINOL 300 MG/1
300 TABLET ORAL DAILY
Qty: 30 TABLET | Refills: 1 | Status: SHIPPED | OUTPATIENT
Start: 2022-10-20

## 2022-10-20 RX ORDER — FUROSEMIDE 20 MG/1
1 TABLET ORAL DAILY
COMMUNITY

## 2022-10-20 RX ORDER — HYDROCODONE BITARTRATE AND ACETAMINOPHEN 10; 325 MG/1; MG/1
1 TABLET ORAL
COMMUNITY
Start: 2022-01-05

## 2022-11-02 ENCOUNTER — PATIENT MESSAGE (OUTPATIENT)
Dept: PHARMACY | Facility: CLINIC | Age: 62
End: 2022-11-02
Payer: MEDICARE

## 2022-11-07 ENCOUNTER — SPECIALTY PHARMACY (OUTPATIENT)
Dept: PHARMACY | Facility: CLINIC | Age: 62
End: 2022-11-07
Payer: MEDICARE

## 2022-11-07 ENCOUNTER — PATIENT MESSAGE (OUTPATIENT)
Dept: PHARMACY | Facility: CLINIC | Age: 62
End: 2022-11-07
Payer: MEDICARE

## 2022-11-07 NOTE — TELEPHONE ENCOUNTER
Incoming call from patients son. Patient is not taking Sceblix and currently refuses. Provider aware. Will close pt out of [facility]. Asked for call back if anything changes.